# Patient Record
Sex: FEMALE | Race: WHITE | NOT HISPANIC OR LATINO | Employment: UNEMPLOYED | URBAN - METROPOLITAN AREA
[De-identification: names, ages, dates, MRNs, and addresses within clinical notes are randomized per-mention and may not be internally consistent; named-entity substitution may affect disease eponyms.]

---

## 2023-09-07 PROBLEM — Z15.01 GENETIC SUSCEPTIBILITY TO MALIGNANT NEOPLASM OF BREAST: Status: ACTIVE | Noted: 2023-09-07

## 2023-09-07 PROBLEM — M54.31 RIGHT SIDED SCIATICA: Status: ACTIVE | Noted: 2023-09-07

## 2023-09-07 PROBLEM — M81.0 AGE-RELATED OSTEOPOROSIS WITHOUT CURRENT PATHOLOGICAL FRACTURE: Status: ACTIVE | Noted: 2023-09-07

## 2023-09-07 PROBLEM — E55.9 VITAMIN D DEFICIENCY: Status: ACTIVE | Noted: 2023-09-07

## 2023-09-07 PROBLEM — K21.00 GASTROESOPHAGEAL REFLUX DISEASE WITH ESOPHAGITIS WITHOUT HEMORRHAGE: Status: ACTIVE | Noted: 2023-09-07

## 2023-09-07 PROBLEM — Z98.890 HISTORY OF COLPOSCOPY: Status: ACTIVE | Noted: 2023-09-07

## 2023-09-07 RX ORDER — ESTRADIOL 4 UG/1
1 INSERT VAGINAL DAILY
COMMUNITY
Start: 2019-09-20 | End: 2024-02-02 | Stop reason: ALTCHOICE

## 2024-02-01 NOTE — PROGRESS NOTES
Subjective   Shannan Taveras is a 68 y.o. female who presents as a NPV to ESTABLISH PCP CARE and FOR CARE GAP REVIEW.     HPI:      67 yo post-menopausal female NEVER SMOKER NPV to ESTABLISH PCP CARE and FOR CARE GAP REVIEW    [facility] records reviewed.    PMHx:  -hyperlipidemia; , ; 2/21/22  -severe osteoporosis; DEXA 2/9/23; patient opposed to starting aldendronate/bisphonate therapy  -GERD  -Increase lifetime risk of breast cancer; multiple family members with breast cancer  -elevated hemoglobin (15.5) and hemactocrit (44.9); 1/28/23  -elevated bilirubin (1.3); 1/28/2023  -history of abormal pap smears; undergoes yearly paps; per OBGYN continue screens x 10 yrs past last [id number]  -Vit D deficiency  -right sciatica    Healthcare Providers:  GYN: Catina Barcenas; last seen 3/25/23  Prior PCP: Dr. Garcia; last seen 1/27/23 for a Medicare Physical      Preventive Health Services:  -Last physical: 1/27/23  -last pap: 2/8/21- Negative for intraepithelial lesion or malignancy.   -last mammogram: 2/8/23 WNL  -last colonoscopy: 6 years ago per patient no polyps==> NEXT DUE 10 years; per patient next due 2028  -last DEXA 2/9/23: severe osteoporosis  -last STI screening:    Mammogram 2/8/23:  FINDINGS  Scattered fibroglandular densities are again seen in the parenchyma of both  breasts.  There is no significant change since the prior study.  No interval development of a spiculated mass or suspicious cluster of  microcalcifications is identified.  Small nodular densities are again seen in both breasts, similar to prior  exam.     Tyrer Cuzick calculations report this patient's 10-year risk for developing  breast cancer at 3.4 % and lifetime risk at 7.1 %.     IMPRESSION:  No mammographic  evidence of malignancy. No significant change. ACR BI-RADS 2:  Benign finding.    DEXA 2/9/23:  IMPRESSION:  Lumbar spine evaluation demonstrates severe osteopenia.     The overall hip evaluation demonstrates severe  "osteopenia with focal  significant osteoporosis of the femoral neck region.     The VFA images demonstrate no obvious fracture.    Immunizations:   -Childhood vaccines: completed per patient  -shingles: NOW DUE==>DECLINED TODAY, states that she would like to think about it  -Flu vaccine: NOW DUE==> DECLINED  -RSV vaccine: NOW DUE==> DECLINED  -updated COVID vaccine: ==> DECLINED    Immunization History   Administered Date(s) Administered    Moderna SARS-CoV-2 Vaccination 03/10/2021, 04/09/2021    Pneumococcal conjugate vaccine, 20-valent (PREVNAR 20) 01/27/2023    Tdap vaccine, age 7 year and older (BOOSTRIX, ADACEL) 01/01/2009, 05/18/2009, 01/21/2021       Today Shannan reports:    - doing and feeling well.     -significant and strong family history of breast cancer, with her mother experiencing both breast and ovarian cancer.  She reports she has never undergone genetic testing for breast cancer, and is interested in referral to  genetics for testing.    Today she has no reported complaints, issues, or problems.    ROS is NEG for HEADACHE, NAUSEA, VOMITING, DIARRHEA, CHEST PAIN, SOB, and BLEEDING.  Review of systems is negative for all systems except for any identified issues in HPI above.    Last sexually active 10/2023 (no condoms).  Denies history STIs.      SHx:  -lives alone with her cat  -employment: admin part time, [employer]  -tobacco use: NEVER  -alcohol use: 1 beer yearly  -illicits: DENIES      FHx:  Cancer:  -breast and ovarian cancer: mother diagnosed at age 60s;   -breast cancer: maternal aunts, cousins x 2 (diagnosed 40s)   -prostate: father passed of PCa  HTN: mother  DM: DENIES  Heart Disease: DENIES  Stroke: DENIES  Thyroid Disease: DENIES    Advanced Care Planning:  -Advanced Directive: YES  -Code Status:  -Health Care POA: Yes sister-in-law, Rochelle Treviño, is DPOA, brother, Manny Treviño is second        Objective     /70   Pulse 92   Temp 36.9 °C (98.4 °F)   Ht 1.6 m (5' 3\")   " Wt 60.8 kg (134 lb)   SpO2 98%   BMI 23.74 kg/m²      Physical Examination:       GENERAL           General Appearance: well-appearing, well-developed, well-hydrated, well-nourished, no acute distress.        HEENT           NECK supple, no masses or thyromegaly, no carotid bruit.        EYES           Extraocular Movements: normal, bilateral eyes CHANG, no conjunctival injection.        HEART           Rate and Rhythm regular rate and rhythm. Heart sounds: normal S1S2, no S3 or S4. Murmurs: none.        CHEST           Breath sounds: Clear to IPPA, RR<16 no use of accessory muscles.        ABDOMEN           General: Neg for LKKS or masses, no scleral icterus or jaundice.        MUSCULOSKELETAL           Joints Demonstration: Neg for erythema, swelling or joint deformities. gross abnormalities no gross abnormalities.        EXTREMITIES           Lower Extremities: Neg for cyanosis, clubbing or edema.     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    Vitamin D deficiency     Other Visit Diagnoses       Encounter to establish care    -  Primary    Encounter for screening mammogram for malignant neoplasm of breast        Encounter for screening for coronary artery disease        Mixed hyperlipidemia        Immunization counseling        History of abnormal cervical Pap smear        Encounter for hepatitis C screening test for low risk patient        Routine screening for STI (sexually transmitted infection)              Establish PCP care  -labs ordered (see A/P above for details)    Hyperlipidemia  -lipid panel ordered  -low cholesterol, low fat diet, regularly exercise, and limit alcohol intake    Severe Osteoporosis; DEXA 2/9/23  -patient declined referral to rheumatology  ordered  -start OTC Calcium 1,200 mg daily broken up as 400 mg three times a day with meals and Vit D3 2,000 units a day  -discussed bisphonates therapy (alendronate),including the benefits (decrease risk of fracture and reduced bone loss), risks (AVN femoral head), and alternatives (calcium,/weight bearing exercise, etc) to therapy.  Patient not interested in starting aldendronate/bisphonates therapy at this time.  -regular exercise that includes weight bearing exercise, and limit alcohol intake    Strong Family history of breast cancer  -referral to genetics ordered for breast cancer genetic testing    Diabetes Screening  -HgBA1c ordered    Vitamin D deficiency  -Vit D levels ordered     Hep C screening  -Hep C antibody ordered     STI Screening:  -HIV, syphilis, GC/CT, trich ordered    Breast Cancer Screening: MAMMOGRAM NOW DUE   -mammogram ordered     Cervical Cancer Screening; last pap smear 2/8/21- Negative for intraepithelial lesion or malignancy.   -cont pap smears per GYN    Heart Disease Screening:  -CT Heart Ca scoring ordered      Colon Cancer Screening: last colonoscopy 2018 per patient no polyps==> NEXT DUE 10 years ( next due 2028)  -next due 2028     Counseling:       Medication education:         Education:  The patient is counseled regarding potential side-effects of all new medications        Understanding:  Patient expressed understanding        Adherence:  No barriers to adherence identified        Immunizations Counseling  -flu vaccine and shingles now due==> Patient DECLINED  -recommend updated COVID spike and RSV vaccine that can be obtained at local pharmacy     FOLLOW-UP: 4 weeks to discuss and review test results and 8 weeks for MEDICARE WELLNESS VISIT/ PHYSICAL     Discussed recommended plan of care with patient. Patient expressed understanding and agreement with plan of care. All of patient's questions were answered at the time. Patient had no additional questions at the time.            Lynnette Suarez MD, PhD

## 2024-02-02 ENCOUNTER — OFFICE VISIT (OUTPATIENT)
Dept: PRIMARY CARE | Facility: CLINIC | Age: 69
End: 2024-02-02
Payer: MEDICARE

## 2024-02-02 VITALS
DIASTOLIC BLOOD PRESSURE: 70 MMHG | WEIGHT: 134 LBS | TEMPERATURE: 98.4 F | HEIGHT: 63 IN | OXYGEN SATURATION: 98 % | BODY MASS INDEX: 23.74 KG/M2 | SYSTOLIC BLOOD PRESSURE: 118 MMHG | HEART RATE: 92 BPM

## 2024-02-02 DIAGNOSIS — R21 RASH OF NECK: ICD-10-CM

## 2024-02-02 DIAGNOSIS — Z11.59 ENCOUNTER FOR HEPATITIS C SCREENING TEST FOR LOW RISK PATIENT: ICD-10-CM

## 2024-02-02 DIAGNOSIS — K21.00 GASTROESOPHAGEAL REFLUX DISEASE WITH ESOPHAGITIS WITHOUT HEMORRHAGE: ICD-10-CM

## 2024-02-02 DIAGNOSIS — Z71.85 IMMUNIZATION COUNSELING: ICD-10-CM

## 2024-02-02 DIAGNOSIS — Z87.42 HISTORY OF ABNORMAL CERVICAL PAP SMEAR: ICD-10-CM

## 2024-02-02 DIAGNOSIS — Z76.89 ENCOUNTER TO ESTABLISH CARE: Primary | ICD-10-CM

## 2024-02-02 DIAGNOSIS — Z11.3 ROUTINE SCREENING FOR STI (SEXUALLY TRANSMITTED INFECTION): ICD-10-CM

## 2024-02-02 DIAGNOSIS — Z80.3 FAMILY HISTORY OF BREAST CANCER: ICD-10-CM

## 2024-02-02 DIAGNOSIS — E55.9 VITAMIN D DEFICIENCY: ICD-10-CM

## 2024-02-02 DIAGNOSIS — M81.0 AGE-RELATED OSTEOPOROSIS WITHOUT CURRENT PATHOLOGICAL FRACTURE: ICD-10-CM

## 2024-02-02 DIAGNOSIS — Z15.01 GENETIC SUSCEPTIBILITY TO MALIGNANT NEOPLASM OF BREAST: ICD-10-CM

## 2024-02-02 DIAGNOSIS — Z12.31 ENCOUNTER FOR SCREENING MAMMOGRAM FOR MALIGNANT NEOPLASM OF BREAST: ICD-10-CM

## 2024-02-02 DIAGNOSIS — E78.2 MIXED HYPERLIPIDEMIA: ICD-10-CM

## 2024-02-02 DIAGNOSIS — R53.83 OTHER FATIGUE: ICD-10-CM

## 2024-02-02 DIAGNOSIS — Z13.6 ENCOUNTER FOR SCREENING FOR CORONARY ARTERY DISEASE: ICD-10-CM

## 2024-02-02 PROCEDURE — 1126F AMNT PAIN NOTED NONE PRSNT: CPT | Performed by: FAMILY MEDICINE

## 2024-02-02 PROCEDURE — 1157F ADVNC CARE PLAN IN RCRD: CPT | Performed by: FAMILY MEDICINE

## 2024-02-02 PROCEDURE — 99204 OFFICE O/P NEW MOD 45 MIN: CPT | Performed by: FAMILY MEDICINE

## 2024-02-02 PROCEDURE — 1159F MED LIST DOCD IN RCRD: CPT | Performed by: FAMILY MEDICINE

## 2024-02-02 PROCEDURE — 99214 OFFICE O/P EST MOD 30 MIN: CPT | Performed by: FAMILY MEDICINE

## 2024-02-02 RX ORDER — KETOCONAZOLE 20 MG/G
CREAM TOPICAL 2 TIMES DAILY
Qty: 30 G | Refills: 1 | Status: SHIPPED | OUTPATIENT
Start: 2024-02-02 | End: 2024-02-12

## 2024-02-02 ASSESSMENT — PATIENT HEALTH QUESTIONNAIRE - PHQ9
SUM OF ALL RESPONSES TO PHQ9 QUESTIONS 1 AND 2: 0
1. LITTLE INTEREST OR PLEASURE IN DOING THINGS: NOT AT ALL
2. FEELING DOWN, DEPRESSED OR HOPELESS: NOT AT ALL

## 2024-02-02 ASSESSMENT — PAIN SCALES - GENERAL: PAINLEVEL: 0-NO PAIN

## 2024-02-02 NOTE — PATIENT INSTRUCTIONS
It was nice meeting you today.    Please go to [facility] lab or another [facility] lab facility to complete the lab testing that I ordered     I have ordered ketoconazole to apply 2-3 times daily to your rash    Please start OTC Calcium 1,200 mg daily broken up as 400 mg three times a day with meals and Vit D3 2,000 units a day    Please call radiology to schedule to schedule 1) mammogram, 2) CT Heart Ca score    Please call referral line to schedule to see genetics and dermatology. It is usually faster to go to [facility] dermatology.     I recommend receiving the  shingles vaccine, and  the flu vaccine    I recommend the updated COVID vaccine and RSV that can be obtained at your local pharmacy    Please follow a low cholesterol, low fat diet, regularly exercise, and limit alcohol intake    Please schedule a follow up with me in 4  weeks to discuss and review your test results and 8 weeks for a MEDICARE WELLNESS VISIT (PHYSICAL; please use this exact language when scheduling the appointment)

## 2024-02-03 ENCOUNTER — LAB (OUTPATIENT)
Dept: LAB | Facility: LAB | Age: 69
End: 2024-02-03
Payer: MEDICARE

## 2024-02-03 DIAGNOSIS — Z11.59 ENCOUNTER FOR HEPATITIS C SCREENING TEST FOR LOW RISK PATIENT: ICD-10-CM

## 2024-02-03 DIAGNOSIS — E78.2 MIXED HYPERLIPIDEMIA: ICD-10-CM

## 2024-02-03 DIAGNOSIS — E55.9 VITAMIN D DEFICIENCY: ICD-10-CM

## 2024-02-03 DIAGNOSIS — Z76.89 ENCOUNTER TO ESTABLISH CARE: ICD-10-CM

## 2024-02-03 DIAGNOSIS — E87.5 SERUM POTASSIUM ELEVATED: ICD-10-CM

## 2024-02-03 DIAGNOSIS — R53.83 OTHER FATIGUE: ICD-10-CM

## 2024-02-03 DIAGNOSIS — R17 SERUM TOTAL BILIRUBIN ELEVATED: Primary | ICD-10-CM

## 2024-02-03 DIAGNOSIS — Z11.3 ROUTINE SCREENING FOR STI (SEXUALLY TRANSMITTED INFECTION): ICD-10-CM

## 2024-02-03 LAB
25(OH)D3 SERPL-MCNC: 24 NG/ML (ref 31–100)
ALBUMIN SERPL-MCNC: 4.2 G/DL (ref 3.5–5)
ALP BLD-CCNC: 95 U/L (ref 35–125)
ALT SERPL-CCNC: 9 U/L (ref 5–40)
ANION GAP SERPL CALC-SCNC: 10 MMOL/L
AST SERPL-CCNC: 15 U/L (ref 5–40)
BASOPHILS # BLD AUTO: 0.03 X10*3/UL (ref 0–0.1)
BASOPHILS NFR BLD AUTO: 0.5 %
BILIRUB SERPL-MCNC: 1.3 MG/DL (ref 0.1–1.2)
BUN SERPL-MCNC: 16 MG/DL (ref 8–25)
CALCIUM SERPL-MCNC: 9.5 MG/DL (ref 8.5–10.4)
CHLORIDE SERPL-SCNC: 105 MMOL/L (ref 97–107)
CHOLEST SERPL-MCNC: 257 MG/DL (ref 133–200)
CHOLEST/HDLC SERPL: 3.5 {RATIO}
CO2 SERPL-SCNC: 28 MMOL/L (ref 24–31)
CREAT SERPL-MCNC: 0.8 MG/DL (ref 0.4–1.6)
EGFRCR SERPLBLD CKD-EPI 2021: 81 ML/MIN/1.73M*2
EOSINOPHIL # BLD AUTO: 0.1 X10*3/UL (ref 0–0.7)
EOSINOPHIL NFR BLD AUTO: 1.8 %
ERYTHROCYTE [DISTWIDTH] IN BLOOD BY AUTOMATED COUNT: 12.9 % (ref 11.5–14.5)
GLUCOSE SERPL-MCNC: 86 MG/DL (ref 65–99)
HCT VFR BLD AUTO: 42.4 % (ref 36–46)
HCV AB SER QL: NONREACTIVE
HDLC SERPL-MCNC: 74 MG/DL
HGB BLD-MCNC: 13.9 G/DL (ref 12–16)
HIV 1+2 AB+HIV1 P24 AG SERPL QL IA: NONREACTIVE
IMM GRANULOCYTES # BLD AUTO: 0.02 X10*3/UL (ref 0–0.7)
IMM GRANULOCYTES NFR BLD AUTO: 0.4 % (ref 0–0.9)
LDLC SERPL CALC-MCNC: 167 MG/DL (ref 65–130)
LYMPHOCYTES # BLD AUTO: 1.88 X10*3/UL (ref 1.2–4.8)
LYMPHOCYTES NFR BLD AUTO: 34.4 %
MCH RBC QN AUTO: 28.8 PG (ref 26–34)
MCHC RBC AUTO-ENTMCNC: 32.8 G/DL (ref 32–36)
MCV RBC AUTO: 88 FL (ref 80–100)
MONOCYTES # BLD AUTO: 0.4 X10*3/UL (ref 0.1–1)
MONOCYTES NFR BLD AUTO: 7.3 %
NEUTROPHILS # BLD AUTO: 3.03 X10*3/UL (ref 1.2–7.7)
NEUTROPHILS NFR BLD AUTO: 55.6 %
NRBC BLD-RTO: 0 /100 WBCS (ref 0–0)
PLATELET # BLD AUTO: 227 X10*3/UL (ref 150–450)
POTASSIUM SERPL-SCNC: 5.2 MMOL/L (ref 3.4–5.1)
PROT SERPL-MCNC: 6.7 G/DL (ref 5.9–7.9)
RBC # BLD AUTO: 4.83 X10*6/UL (ref 4–5.2)
SODIUM SERPL-SCNC: 143 MMOL/L (ref 133–145)
TRIGL SERPL-MCNC: 80 MG/DL (ref 40–150)
TSH SERPL DL<=0.05 MIU/L-ACNC: 1.89 MIU/L (ref 0.27–4.2)
WBC # BLD AUTO: 5.5 X10*3/UL (ref 4.4–11.3)

## 2024-02-03 PROCEDURE — 36415 COLL VENOUS BLD VENIPUNCTURE: CPT

## 2024-02-03 PROCEDURE — 85025 COMPLETE CBC W/AUTO DIFF WBC: CPT

## 2024-02-03 PROCEDURE — 80053 COMPREHEN METABOLIC PANEL: CPT

## 2024-02-03 PROCEDURE — 87389 HIV-1 AG W/HIV-1&-2 AB AG IA: CPT

## 2024-02-03 PROCEDURE — 84443 ASSAY THYROID STIM HORMONE: CPT

## 2024-02-03 PROCEDURE — 86780 TREPONEMA PALLIDUM: CPT

## 2024-02-03 PROCEDURE — 82306 VITAMIN D 25 HYDROXY: CPT

## 2024-02-03 PROCEDURE — 80061 LIPID PANEL: CPT

## 2024-02-03 PROCEDURE — 86803 HEPATITIS C AB TEST: CPT

## 2024-02-04 LAB — TREPONEMA PALLIDUM IGG+IGM AB [PRESENCE] IN SERUM OR PLASMA BY IMMUNOASSAY: NONREACTIVE

## 2024-03-05 ENCOUNTER — APPOINTMENT (OUTPATIENT)
Dept: PRIMARY CARE | Facility: CLINIC | Age: 69
End: 2024-03-05
Payer: MEDICARE

## 2024-03-07 NOTE — PROGRESS NOTES
Subjective   Shannan Taveras is a 68 y.o. female who presents for FOLLOW-UP VISIT TO DISCUSS and REVIEW TEST RESULTS.    HPI:        67 yo post-menopausal female NEVER SMOKER who presents for FOLLOW-UP VISIT TO DISCUSS and REVIEW TEST RESULTS.     [facility] records reviewed.    Last seen by me on 2/2/24 as  NPV to ESTABLISH PCP CARE and FOR CARE GAP REVIEW. At Visit:  -ORDERED LABS==> COMPLETED  -ORDERED MAMMOGRAM==> NOT YET COMPLETED  -ORDERED CT HEART CA SCORING==>NOT YET COMPLETED  -start ketoconazole to apply 2-3 times daily to your  neck rash  -Strong Family history of breast cancer: ORDERED referral to genetics ordered for breast cancer genetic testing  -severe osteoporosis on DEXA 2/2023: start OTC Calcium 1,200 mg daily broken up as 400 mg three times a day with meals and Vit D3 2,000 units a day  -discussed bisphonates therapy (alendronate),including the benefits (decrease risk of fracture and reduced bone loss), risks (AVN femoral head), and alternatives (calcium,/weight bearing exercise, etc) to therapy.  Patient not interested in starting aldendronate/bisphonates therapy at this time.  -regular exercise that includes weight bearing exercise, and limit alcohol intake  -flu vaccine and shingles now due==> Patient DECLINED  -recommend updated COVID spike and RSV vaccine that can be obtained at local pharmacy   -FOLLOW-UP: 4 weeks to discuss and review test results and 8 weeks for MEDICARE WELLNESS VISIT/ PHYSICAL      After visit on 2/2/24 based on test results:    -Vit D low (24): patient advised to please start over the counter Vitamin D3 2,000 units daily     -elevated cholesterol: total cholesterol 257 and LDL cholesterol 167. Good triglyceride control. Good HDL levels. Patient  advised Please follow a low cholesterol, low fat diet, regularly exercise, and limit alcohol intake.  At your follow-up visit we should discuss starting a statin medication to help lower your cholesterol levels.     -elevated  bilirubin (1.3) and potassium (5.2).  Patient advised that I have ordered a repeat lab and to please go to [facility] to have drawn     -normal kidney function and glucose     -Hep C, syphilis, and HIV negative     -thyroid function normal     -normal blood counts       PMHx:  -hyperlipidemia; , ; 2/21/22  -severe osteoporosis; DEXA 2/9/23; patient opposed to starting aldendronate/bisphonate therapy  -GERD  -Increase lifetime risk of breast cancer; multiple family members with breast cancer  -elevated hemoglobin (15.5) and hemactocrit (44.9); 1/28/23  -elevated bilirubin (1.3); 1/28/2023  -history of abormal pap smears; undergoes yearly paps; per OBGYN continue screens x 10 yrs past last [id number]  -Vit D deficiency  -right sciatica     Healthcare Providers:  GYN: Catina Barcenas; last seen 3/25/23  Prior PCP: Dr. Garcia; last seen 1/27/23 for a Medicare Physical        Preventive Health Services:  -Last physical: 1/27/23  -last pap: 2/8/21- Negative for intraepithelial lesion or malignancy.   -last mammogram: 2/8/23 WNL  -last colonoscopy: 6 years ago per patient no polyps==> NEXT DUE 10 years; per patient next due 2028  -last DEXA 2/9/23: severe osteoporosis  -last STI screening:     Mammogram 2/8/23:  FINDINGS  Scattered fibroglandular densities are again seen in the parenchyma of both  breasts.  There is no significant change since the prior study.  No interval development of a spiculated mass or suspicious cluster of  microcalcifications is identified.  Small nodular densities are again seen in both breasts, similar to prior  exam.     Tyrer Cuzick calculations report this patient's 10-year risk for developing  breast cancer at 3.4 % and lifetime risk at 7.1 %.     IMPRESSION:  No mammographic  evidence of malignancy. No significant change. ACR BI-RADS 2:  Benign finding.     DEXA 2/9/23:  IMPRESSION:  Lumbar spine evaluation demonstrates severe osteopenia.     The overall hip evaluation  demonstrates severe osteopenia with focal  significant osteoporosis of the femoral neck region.     The VFA images demonstrate no obvious fracture.     Immunizations:   -Childhood vaccines: completed per patient  -shingles: NOW DUE==>DECLINED TODAY, states that she would like to think about it  -Flu vaccine: NOW DUE==> DECLINED  -RSV vaccine: NOW DUE==> DECLINED  -updated COVID vaccine: ==> DECLINED       Immunization History   Administered Date(s) Administered    Moderna SARS-CoV-2 Vaccination 03/10/2021, 04/09/2021    Pneumococcal conjugate vaccine, 20-valent (PREVNAR 20) 01/27/2023    Tdap vaccine, age 7 year and older (BOOSTRIX, ADACEL) 01/01/2009, 05/18/2009, 01/21/2021             Today Shannan reports:     - doing and feeling well.     -has been very busy at work and is looking forward to retiring in June 2024 and is very excited.     -has not yet scheduled appointment with genetic testing  for breast cancer, and  dermatology but she will    -stopped using ketoconazole on left neck rash, since it was worsening the rash     Today she has no reported complaints, issues, or problems.     ROS is NEG for HEADACHE, NAUSEA, VOMITING, DIARRHEA, CHEST PAIN, SOB, and BLEEDING.  Review of systems is negative for all systems except for any identified issues in HPI above.     Last sexually active 10/2023 (no condoms).  Denies history STIs.        SHx:  -lives alone with her cat  -employment: admin part time, [employer]  -tobacco use: NEVER  -alcohol use: 1 beer yearly  -illicits: DENIES        FHx:  Cancer:  -breast and ovarian cancer: mother diagnosed at age 60s;   -breast cancer: maternal aunts, cousins x 2 (diagnosed 40s)   -prostate: father passed of PCa  HTN: mother  DM: DENIES  Heart Disease: DENIES  Stroke: DENIES  Thyroid Disease: DENIES     Advanced Care Planning:  -Advanced Directive: YES  -Code Status:  -Health Care POA: Yes sister-in-law, Rochelle Treviño, is DPOA, brother, Manny Treviño is second    Review  "of systems is essentially negative for all systems except for any identified issues in HPI above.    Objective     /70   Pulse 69   Temp 36.3 °C (97.4 °F)   Ht 1.6 m (5' 3\")   Wt 61.2 kg (135 lb)   SpO2 98%   BMI 23.91 kg/m²      Physical Examination:       GENERAL           General Appearance: well-appearing, well-developed, well-hydrated, well-nourished, no acute distress.        HEENT           NECK supple, no masses or thyromegaly, no carotid bruit.        EYES           Extraocular Movements: normal, bilateral eyes CHANG, no conjunctival injection.        HEART           Rate and Rhythm regular rate and rhythm. Heart sounds: normal S1S2, no S3 or S4. Murmurs: none.        CHEST           Breath sounds: Clear to IPPA, RR<16 no use of accessory muscles.        ABDOMEN           General: Neg for LKKS or masses, no scleral icterus or jaundice.        MUSCULOSKELETAL           Joints Demonstration: Neg for erythema, swelling or joint deformities. gross abnormalities no gross abnormalities.        EXTREMITIES           Lower Extremities: Neg for cyanosis, clubbing or edema.       SKIN: left neck rash with mild erythema at nape of left neck. No signs of cellulitis,, no lesions, or papules, no bleeding.        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 ASCVD risk (5.4%) borderline.  -discussed with patient recommendation to start atorvastatin 20 mg daily; patient declined starting atorvastatin at this time  - low cholesterol, low fat diet, regularly exercise, and limit alcohol intake.   -CT Heart Ca scoring previously ordered   "   Elevated bilirubin (1.3) and potassium (5.2).  -previously ordered repeat  CMP==> NOT YET COMPLETED==> patient advised to please go to [facility] to have drawn       Left sided neck rash: no signs of cellulitis: ? Eczema vs fungal skin rash. Per patient ketoconazole worsened rash and is not interested in trial of steroid cream  -referral to dermatology ordered  -emergency dept precautions discussed and reviewed with patient      Severe Osteoporosis; DEXA 2/9/23  -patient declined referral to rheumatology ordered  -cont OTC Calcium 1,200 mg daily broken up as 400 mg three times a day with meals and Vit D3 2,000 units a day  -discussed bisphonates therapy (alendronate),including the benefits (decrease risk of fracture and reduced bone loss), risks (AVN femoral head), and alternatives (calcium,/weight bearing exercise, etc) to therapy.  Patient not interested in starting aldendronate/bisphonates therapy at this time.  -regular exercise that includes weight bearing exercise, and limit alcohol intake     Strong Family history of breast cancer  -referral to genetics ordered on 2/2/24 for breast cancer genetic testing==> patient reports that she will call to schedule      Vitamin D deficiency  -cont over the counter Vitamin D3 2,000 units daily  -will plan to repeat Vit D levels in 3 months; next due 5/2024      Breast Cancer Screening: MAMMOGRAM NOW DUE   -mammogram ordered 2/2/24==> NOT YET COMPLETED==> patient advised to please call radiology to schedule     Cervical Cancer Screening; last pap smear 2/8/21- Negative for intraepithelial lesion or malignancy.   -cont pap smears per GYN     Heart Disease Screening:  -CT Heart Ca scoring ordered 2/2/24==>NOT YET COMPLETED==> patient advised to please call radiology to schedule        Colon Cancer Screening: last colonoscopy 2018 per patient no polyps==> NEXT DUE 10 years ( next due 2028)  -next due 2028     Counseling:       Medication education:         Education:  The  patient is counseled regarding potential side-effects of all new medications        Understanding:  Patient expressed understanding        Adherence:  No barriers to adherence identified        Immunizations Counseling  -flu vaccine and shingles now due==> Patient DECLINED 2/2/24 and today 3/8/24  -recommend updated COVID spike and RSV vaccine that can be obtained at local pharmacy     FOLLOW-UP: 4 weeks to discuss and review test results (mammogram, CT heart Ca scoring and repeat CMP) and 8 weeks for MEDICARE WELLNESS VISIT/ PHYSICAL     Discussed recommended plan of care with patient. Patient expressed understanding and agreement with plan of care. All of patient's questions were answered at the time. Patient had no additional questions at the time.          Lynnette Suarez MD, PhD

## 2024-03-07 NOTE — PATIENT INSTRUCTIONS
It was nice seeing you today.     Please go to [facility] lab or another [facility] lab facility to complete the repeat lab testing that I ordered for elevated bilirubin and elevated potassium levels    Please call dermatology for left neck rash; you can call [facility] dermatology since it is faster to be seen there      Please continue  over the counter Calcium 1,200 mg daily broken up as 400 mg three times a day with meals and Vit D3 2,000 units a day    I recommend starting a statin medication for your elevated cholesterol levels and your borderline increased risk (5.4%) of a cardiovascular disease event (heart attack, stroke) in the next 10 years based on your personal risk factors including your cholesterol levels. Starting a statin medication will reduce this risk.     Please call radiology to schedule to schedule 1) mammogram and 2) CT Heart Ca score     Please call referral line to schedule to see genetics and dermatology. It is usually faster to see dermatology through  [facility] dermatology.      I recommend receiving the  shingles vaccine, and  the flu vaccine     I recommend the updated COVID vaccine and RSV that can be obtained at your local pharmacy     Please follow a low cholesterol, low fat diet, regularly exercise, and limit alcohol intake     Please schedule a follow up with me in 4-6  weeks to discuss and review your test results and 8 weeks for a MEDICARE WELLNESS VISIT (PHYSICAL; please use this exact language when scheduling the appointment)

## 2024-03-08 ENCOUNTER — OFFICE VISIT (OUTPATIENT)
Dept: PRIMARY CARE | Facility: CLINIC | Age: 69
End: 2024-03-08
Payer: MEDICARE

## 2024-03-08 VITALS
WEIGHT: 135 LBS | HEART RATE: 69 BPM | SYSTOLIC BLOOD PRESSURE: 122 MMHG | OXYGEN SATURATION: 98 % | TEMPERATURE: 97.4 F | BODY MASS INDEX: 23.92 KG/M2 | HEIGHT: 63 IN | DIASTOLIC BLOOD PRESSURE: 70 MMHG

## 2024-03-08 DIAGNOSIS — Z71.85 IMMUNIZATION COUNSELING: ICD-10-CM

## 2024-03-08 DIAGNOSIS — Z15.01 GENETIC SUSCEPTIBILITY TO MALIGNANT NEOPLASM OF BREAST: ICD-10-CM

## 2024-03-08 DIAGNOSIS — R21 RASH OF NECK: ICD-10-CM

## 2024-03-08 DIAGNOSIS — E78.2 MIXED HYPERLIPIDEMIA: Primary | ICD-10-CM

## 2024-03-08 DIAGNOSIS — M81.0 AGE-RELATED OSTEOPOROSIS WITHOUT CURRENT PATHOLOGICAL FRACTURE: ICD-10-CM

## 2024-03-08 DIAGNOSIS — E55.9 VITAMIN D DEFICIENCY: ICD-10-CM

## 2024-03-08 PROCEDURE — 1157F ADVNC CARE PLAN IN RCRD: CPT | Performed by: FAMILY MEDICINE

## 2024-03-08 PROCEDURE — 99214 OFFICE O/P EST MOD 30 MIN: CPT | Performed by: FAMILY MEDICINE

## 2024-03-08 PROCEDURE — 1126F AMNT PAIN NOTED NONE PRSNT: CPT | Performed by: FAMILY MEDICINE

## 2024-03-08 PROCEDURE — 1036F TOBACCO NON-USER: CPT | Performed by: FAMILY MEDICINE

## 2024-03-08 PROCEDURE — 1159F MED LIST DOCD IN RCRD: CPT | Performed by: FAMILY MEDICINE

## 2024-03-08 RX ORDER — ACETAMINOPHEN 500 MG
2000 TABLET ORAL DAILY
COMMUNITY

## 2024-03-08 ASSESSMENT — PAIN SCALES - GENERAL: PAINLEVEL: 0-NO PAIN

## 2024-03-12 ENCOUNTER — LAB (OUTPATIENT)
Dept: LAB | Facility: LAB | Age: 69
End: 2024-03-12
Payer: MEDICARE

## 2024-03-12 DIAGNOSIS — R17 SERUM TOTAL BILIRUBIN ELEVATED: ICD-10-CM

## 2024-03-12 DIAGNOSIS — R31.29 MICROSCOPIC HEMATURIA: Primary | ICD-10-CM

## 2024-03-12 DIAGNOSIS — E87.5 SERUM POTASSIUM ELEVATED: ICD-10-CM

## 2024-03-12 DIAGNOSIS — Z76.89 ENCOUNTER TO ESTABLISH CARE: ICD-10-CM

## 2024-03-12 LAB
ALBUMIN SERPL-MCNC: 4 G/DL (ref 3.5–5)
ALP BLD-CCNC: 102 U/L (ref 35–125)
ALT SERPL-CCNC: 9 U/L (ref 5–40)
ANION GAP SERPL CALC-SCNC: 12 MMOL/L
APPEARANCE UR: CLEAR
AST SERPL-CCNC: 15 U/L (ref 5–40)
BACTERIA #/AREA URNS AUTO: ABNORMAL /HPF
BILIRUB SERPL-MCNC: 1.6 MG/DL (ref 0.1–1.2)
BILIRUB UR STRIP.AUTO-MCNC: NEGATIVE MG/DL
BUN SERPL-MCNC: 20 MG/DL (ref 8–25)
CALCIUM SERPL-MCNC: 9.5 MG/DL (ref 8.5–10.4)
CHLORIDE SERPL-SCNC: 103 MMOL/L (ref 97–107)
CO2 SERPL-SCNC: 28 MMOL/L (ref 24–31)
COLOR UR: YELLOW
CREAT SERPL-MCNC: 0.7 MG/DL (ref 0.4–1.6)
EGFRCR SERPLBLD CKD-EPI 2021: >90 ML/MIN/1.73M*2
GLUCOSE SERPL-MCNC: 87 MG/DL (ref 65–99)
GLUCOSE UR STRIP.AUTO-MCNC: NORMAL MG/DL
KETONES UR STRIP.AUTO-MCNC: NEGATIVE MG/DL
LEUKOCYTE ESTERASE UR QL STRIP.AUTO: ABNORMAL
MUCOUS THREADS #/AREA URNS AUTO: ABNORMAL /LPF
NITRITE UR QL STRIP.AUTO: NEGATIVE
PH UR STRIP.AUTO: 5.5 [PH]
POTASSIUM SERPL-SCNC: 3.9 MMOL/L (ref 3.4–5.1)
PROT SERPL-MCNC: 6.6 G/DL (ref 5.9–7.9)
PROT UR STRIP.AUTO-MCNC: ABNORMAL MG/DL
RBC # UR STRIP.AUTO: ABNORMAL /UL
RBC #/AREA URNS AUTO: ABNORMAL /HPF
SODIUM SERPL-SCNC: 143 MMOL/L (ref 133–145)
SP GR UR STRIP.AUTO: 1.02
SQUAMOUS #/AREA URNS AUTO: ABNORMAL /HPF
UROBILINOGEN UR STRIP.AUTO-MCNC: NORMAL MG/DL
WBC #/AREA URNS AUTO: ABNORMAL /HPF

## 2024-03-12 PROCEDURE — 81001 URINALYSIS AUTO W/SCOPE: CPT

## 2024-03-12 PROCEDURE — 36415 COLL VENOUS BLD VENIPUNCTURE: CPT

## 2024-03-12 PROCEDURE — 80053 COMPREHEN METABOLIC PANEL: CPT

## 2024-03-22 ENCOUNTER — HOSPITAL ENCOUNTER (OUTPATIENT)
Dept: RADIOLOGY | Facility: HOSPITAL | Age: 69
Discharge: HOME | End: 2024-03-22
Payer: MEDICARE

## 2024-03-22 VITALS — WEIGHT: 133 LBS | BODY MASS INDEX: 23.57 KG/M2 | HEIGHT: 63 IN

## 2024-03-22 DIAGNOSIS — Z12.31 ENCOUNTER FOR SCREENING MAMMOGRAM FOR MALIGNANT NEOPLASM OF BREAST: ICD-10-CM

## 2024-03-22 DIAGNOSIS — Z13.6 ENCOUNTER FOR SCREENING FOR CORONARY ARTERY DISEASE: ICD-10-CM

## 2024-03-22 PROCEDURE — 75571 CT HRT W/O DYE W/CA TEST: CPT

## 2024-03-22 PROCEDURE — 77067 SCR MAMMO BI INCL CAD: CPT | Performed by: RADIOLOGY

## 2024-03-22 PROCEDURE — 77063 BREAST TOMOSYNTHESIS BI: CPT | Performed by: RADIOLOGY

## 2024-03-22 PROCEDURE — 77067 SCR MAMMO BI INCL CAD: CPT

## 2024-03-25 DIAGNOSIS — R93.1 ELEVATED CORONARY ARTERY CALCIUM SCORE: Primary | ICD-10-CM

## 2024-06-06 ENCOUNTER — APPOINTMENT (OUTPATIENT)
Dept: OBSTETRICS AND GYNECOLOGY | Facility: CLINIC | Age: 69
End: 2024-06-06
Payer: MEDICARE

## 2024-06-06 ENCOUNTER — OFFICE VISIT (OUTPATIENT)
Dept: OBSTETRICS AND GYNECOLOGY | Facility: CLINIC | Age: 69
End: 2024-06-06
Payer: MEDICARE

## 2024-06-06 VITALS
HEIGHT: 63 IN | SYSTOLIC BLOOD PRESSURE: 132 MMHG | BODY MASS INDEX: 25.16 KG/M2 | WEIGHT: 142 LBS | DIASTOLIC BLOOD PRESSURE: 76 MMHG

## 2024-06-06 DIAGNOSIS — Z11.51 SCREENING FOR HUMAN PAPILLOMAVIRUS: ICD-10-CM

## 2024-06-06 DIAGNOSIS — Z01.419 VISIT FOR GYNECOLOGIC EXAMINATION: Primary | ICD-10-CM

## 2024-06-06 DIAGNOSIS — Z12.11 SCREEN FOR COLON CANCER: ICD-10-CM

## 2024-06-06 DIAGNOSIS — M81.0 POSTMENOPAUSAL BONE LOSS: ICD-10-CM

## 2024-06-06 DIAGNOSIS — Z78.0 MENOPAUSE: ICD-10-CM

## 2024-06-06 DIAGNOSIS — Z12.4 SCREENING FOR CERVICAL CANCER: ICD-10-CM

## 2024-06-06 DIAGNOSIS — Z87.410 HISTORY OF CERVICAL DYSPLASIA: ICD-10-CM

## 2024-06-06 DIAGNOSIS — N95.2 POSTMENOPAUSAL ATROPHIC VAGINITIS: ICD-10-CM

## 2024-06-06 PROCEDURE — 1036F TOBACCO NON-USER: CPT | Performed by: OBSTETRICS & GYNECOLOGY

## 2024-06-06 PROCEDURE — 1157F ADVNC CARE PLAN IN RCRD: CPT | Performed by: OBSTETRICS & GYNECOLOGY

## 2024-06-06 PROCEDURE — 99397 PER PM REEVAL EST PAT 65+ YR: CPT | Performed by: OBSTETRICS & GYNECOLOGY

## 2024-06-06 PROCEDURE — 87624 HPV HI-RISK TYP POOLED RSLT: CPT | Performed by: OBSTETRICS & GYNECOLOGY

## 2024-06-06 PROCEDURE — 1159F MED LIST DOCD IN RCRD: CPT | Performed by: OBSTETRICS & GYNECOLOGY

## 2024-06-06 PROCEDURE — 1126F AMNT PAIN NOTED NONE PRSNT: CPT | Performed by: OBSTETRICS & GYNECOLOGY

## 2024-06-06 ASSESSMENT — LIFESTYLE VARIABLES
SKIP TO QUESTIONS 9-10: 1
HOW OFTEN DO YOU HAVE SIX OR MORE DRINKS ON ONE OCCASION: NEVER
AUDIT-C TOTAL SCORE: 0
HOW OFTEN DO YOU HAVE A DRINK CONTAINING ALCOHOL: NEVER
HOW MANY STANDARD DRINKS CONTAINING ALCOHOL DO YOU HAVE ON A TYPICAL DAY: PATIENT DOES NOT DRINK

## 2024-06-06 ASSESSMENT — ENCOUNTER SYMPTOMS
DIZZINESS: 0
DEPRESSION: 0
UNEXPECTED WEIGHT CHANGE: 0
JOINT SWELLING: 0
SHORTNESS OF BREATH: 0
HEADACHES: 0
DIFFICULTY URINATING: 0
ADENOPATHY: 0
ABDOMINAL DISTENTION: 0
WEAKNESS: 0
ACTIVITY CHANGE: 0
CHEST TIGHTNESS: 0
ABDOMINAL PAIN: 0
FATIGUE: 0
DYSURIA: 0
COLOR CHANGE: 0
OCCASIONAL FEELINGS OF UNSTEADINESS: 0
LOSS OF SENSATION IN FEET: 0

## 2024-06-06 ASSESSMENT — PATIENT HEALTH QUESTIONNAIRE - PHQ9
2. FEELING DOWN, DEPRESSED OR HOPELESS: NOT AT ALL
1. LITTLE INTEREST OR PLEASURE IN DOING THINGS: NOT AT ALL
SUM OF ALL RESPONSES TO PHQ9 QUESTIONS 1 & 2: 0

## 2024-06-06 ASSESSMENT — PAIN SCALES - GENERAL: PAINLEVEL: 0-NO PAIN
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
## 2024-06-10 ENCOUNTER — APPOINTMENT (OUTPATIENT)
Dept: OBSTETRICS AND GYNECOLOGY | Facility: CLINIC | Age: 69
End: 2024-06-10
Payer: MEDICARE

## 2024-06-17 ENCOUNTER — APPOINTMENT (OUTPATIENT)
Dept: CARDIOLOGY | Facility: CLINIC | Age: 69
End: 2024-06-17
Payer: MEDICARE

## 2024-06-21 LAB
CYTOLOGY CMNT CVX/VAG CYTO-IMP: NORMAL
HPV HR 12 DNA GENITAL QL NAA+PROBE: NEGATIVE
HPV HR GENOTYPES PNL CVX NAA+PROBE: NEGATIVE
HPV16 DNA SPEC QL NAA+PROBE: NEGATIVE
HPV18 DNA SPEC QL NAA+PROBE: NEGATIVE
LAB AP HPV GENOTYPE QUESTION: YES
LAB AP HPV HR: NORMAL
LAB AP PREVIOUS ABNORMAL HISTORY: NORMAL
LAB AP TREATMENT HISTORY: NORMAL
LABORATORY COMMENT REPORT: NORMAL
MENSTRUAL HX REPORTED: NORMAL
PATH REPORT.TOTAL CANCER: NORMAL

## 2024-07-01 ENCOUNTER — APPOINTMENT (OUTPATIENT)
Dept: PRIMARY CARE | Facility: CLINIC | Age: 69
End: 2024-07-01
Payer: MEDICARE

## 2024-07-17 ENCOUNTER — OFFICE VISIT (OUTPATIENT)
Dept: CARDIOLOGY | Facility: CLINIC | Age: 69
End: 2024-07-17
Payer: MEDICARE

## 2024-07-17 VITALS
BODY MASS INDEX: 24.98 KG/M2 | WEIGHT: 141 LBS | HEIGHT: 63 IN | DIASTOLIC BLOOD PRESSURE: 86 MMHG | RESPIRATION RATE: 18 BRPM | HEART RATE: 66 BPM | SYSTOLIC BLOOD PRESSURE: 136 MMHG

## 2024-07-17 DIAGNOSIS — R93.1 ELEVATED CORONARY ARTERY CALCIUM SCORE: ICD-10-CM

## 2024-07-17 DIAGNOSIS — I25.10 CORONARY ARTERY DISEASE INVOLVING NATIVE CORONARY ARTERY OF NATIVE HEART WITHOUT ANGINA PECTORIS: Primary | ICD-10-CM

## 2024-07-17 PROCEDURE — 1126F AMNT PAIN NOTED NONE PRSNT: CPT | Performed by: INTERNAL MEDICINE

## 2024-07-17 PROCEDURE — 1157F ADVNC CARE PLAN IN RCRD: CPT | Performed by: INTERNAL MEDICINE

## 2024-07-17 PROCEDURE — 99204 OFFICE O/P NEW MOD 45 MIN: CPT | Performed by: INTERNAL MEDICINE

## 2024-07-17 PROCEDURE — 99214 OFFICE O/P EST MOD 30 MIN: CPT | Performed by: INTERNAL MEDICINE

## 2024-07-17 PROCEDURE — 1159F MED LIST DOCD IN RCRD: CPT | Performed by: INTERNAL MEDICINE

## 2024-07-17 PROCEDURE — 3008F BODY MASS INDEX DOCD: CPT | Performed by: INTERNAL MEDICINE

## 2024-07-17 PROCEDURE — 1036F TOBACCO NON-USER: CPT | Performed by: INTERNAL MEDICINE

## 2024-07-17 RX ORDER — ROSUVASTATIN CALCIUM 10 MG/1
10 TABLET, COATED ORAL DAILY
Qty: 60 TABLET | Refills: 0 | Status: SHIPPED | OUTPATIENT
Start: 2024-07-17 | End: 2024-09-15

## 2024-07-17 ASSESSMENT — PATIENT HEALTH QUESTIONNAIRE - PHQ9
SUM OF ALL RESPONSES TO PHQ9 QUESTIONS 1 AND 2: 0
2. FEELING DOWN, DEPRESSED OR HOPELESS: NOT AT ALL
1. LITTLE INTEREST OR PLEASURE IN DOING THINGS: NOT AT ALL

## 2024-07-17 ASSESSMENT — PAIN SCALES - GENERAL: PAINLEVEL: 0-NO PAIN

## 2024-07-17 ASSESSMENT — ENCOUNTER SYMPTOMS
LOSS OF SENSATION IN FEET: 0
OCCASIONAL FEELINGS OF UNSTEADINESS: 0
DEPRESSION: 0

## 2024-07-17 ASSESSMENT — LIFESTYLE VARIABLES
HOW OFTEN DO YOU HAVE SIX OR MORE DRINKS ON ONE OCCASION: NEVER
HOW MANY STANDARD DRINKS CONTAINING ALCOHOL DO YOU HAVE ON A TYPICAL DAY: PATIENT DOES NOT DRINK
SKIP TO QUESTIONS 9-10: 1
HOW OFTEN DO YOU HAVE A DRINK CONTAINING ALCOHOL: NEVER
AUDIT-C TOTAL SCORE: 0

## 2024-07-17 NOTE — PROGRESS NOTES
Primary Care Physician: Lynnette Suarez MD PhD   Date of Visit: 07/17/2024  8:00 AM EDT  Type of Visit: New patient visit    Chief Complaint:  Chief Complaint   Patient presents with    New Patient Visit     Calcium score 248        HPI  Shannan Taveras 68 y.o. female who presents establish care.    She denies any angina, shortness of breath, paroxysmal nocturnal dyspnea, orthopnea, peripheral edema, near-syncope, syncope, or palpitations.  Reports that she is fairly active.    Has been referred to me for an elevated calcium score.    Review of Systems   12 point review of systems was obtained in detail and is negative other than that noted above or below.     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    Shannan has a past surgical history that includes Other surgical history (12/24/2013); Other surgical history (12/24/2013); Colposcopy; and Breast biopsy.    Social/Family History:  Shannan reports that she has never smoked. She has never used smokeless tobacco. She reports that she does not currently use alcohol. She reports that she does not use drugs.    Shannan's family history includes Breast cancer in her mother and mother's sister; Cancer in her father; Dementia in her brother; No Known Problems in her brother, son, son, and son; Ovarian cancer in her mother; Prostate cancer in her father; gastric bypass in her brother.    Allergies:  Allergies   Allergen Reactions    Penicillin Hives    Sulfa (Sulfonamide Antibiotics) Hives       Medications:  Current Outpatient Medications   Medication Sig Dispense Refill    calcium carbonate (CALCIUM 600 ORAL) Take 1 tablet by mouth once daily.      cholecalciferol (Vitamin D3) 50 mcg (2,000 unit) capsule Take 1 capsule (50 mcg) by mouth  once daily.      multivit-min/vit C/herb no.124 (AIRBORNE GUMMY ORAL) Take 2 tablets by mouth once daily.      rosuvastatin (Crestor) 10 mg tablet Take 1 tablet (10 mg) by mouth once daily. 60 tablet 0     No current facility-administered medications for this visit.       Objective:   Vitals:    07/17/24 0754   BP: 136/86   Pulse: 66   Resp: 18       S1-S2 normal, regular rate and rhythm, no murmurs, rubs, or gallops  Clear to auscultation bilaterally    Labs and Imaging:      Latest Ref Rng & Units 6/30/2018     9:53 AM 10/18/2019     9:54 AM 1/23/2021     8:31 AM 2/21/2022     8:51 AM 1/28/2023     9:04 AM 2/3/2024     9:09 AM 3/12/2024     7:57 AM   Electrolytes   Na 133 - 145 mmol/L 141  142  140  142  141  143  143    K 3.4 - 5.1 mmol/L 4.2  4.5  4.2  4.0  4.4  5.2  3.9    Cl 97 - 107 mmol/L 105  103  103  103  102  105  103    CO2 24 - 31 mmol/L 22  27  26  24  25  28  28    Cr 0.40 - 1.60 mg/dL 0.7  0.7  0.8  0.7  0.7  0.80  0.70    Ca 8.5 - 10.4 mg/dL 9.7  9.8  9.4  9.3  9.4  9.5  9.5          Latest Ref Rng & Units 6/30/2018     9:53 AM 10/18/2019     9:54 AM 1/23/2021     8:31 AM 2/6/2021     9:02 AM 2/21/2022     8:51 AM 1/28/2023     9:04 AM 2/3/2024     9:09 AM   CBC   Hb 12.0 - 16.0 g/dL 15.4  14.6  14.4  14.2  15.2  15.5  13.9    Hct 36.0 - 46.0 % 44.5  43.1  45.0  42.2  44.6  44.9  42.4    MCV 80 - 100 fL 85.7  87.6  89.5  88.3  85.6  86.0  88    RDW 11.5 - 14.5 %       12.9          Latest Ref Rng & Units 6/30/2018     9:53 AM 10/18/2019     9:54 AM 1/23/2021     8:31 AM 2/21/2022     8:51 AM 1/28/2023     9:04 AM 2/3/2024     9:09 AM 3/12/2024     7:57 AM   Lipids   Chol 133 - 200 mg/dL 242  278  208  248  229  257     HDL >50.0 mg/dL 63  79  68  76  67  74.0     LDL 65 - 130 mg/dL 150  179  124  156  143  167     Trig 40 - 150 mg/dL 144  101  79  78  96  80     ALT 5 - 40 U/L 11  14  15  14  11  9  9    AST 5 - 40 U/L 16  18  15  18  15  15  15          Latest Ref Rng & Units 6/30/2018     9:53 AM  "10/18/2019     9:54 AM 1/23/2021     8:31 AM 2/3/2024     9:09 AM   Thyroid   TSH 0.27 - 4.20 mIU/L 1.67  1.53  1.46  1.89           No data to display                 No results found for: \"HGBA1C\", \"ALBUR\"     The 10-year ASCVD risk score (Jany MATT, et al., 2019) is: 7%    Values used to calculate the score:      Age: 68 years      Sex: Female      Is Non- : No      Diabetic: No      Tobacco smoker: No      Systolic Blood Pressure: 136 mmHg      Is BP treated: No      HDL Cholesterol: 74 mg/dL      Total Cholesterol: 257 mg/dL     Echocardiogram: No results found for this or any previous visit.     Echocardiogram: No results found for this or any previous visit from the past 1825 days.    Stress Testing: No results found for this or any previous visit from the past 1825 days.    Cardiac Catheterization: No results found for this or any previous visit from the past 1825 days.    Cardiac Scoring:     FINDINGS:  The score and distribution of calcium in the coronary arteries is as  follows:    LM 0,  ,  LCx 12,  RCA 98,    Total 248    The visualized mid/lower ascending thoracic aorta measures 3.2 cm in  diameter. The heart is normal in size. No pericardial effusion is  present.    No gross evidence of mediastinal or hilar lymphadenopathy or masses  is identified. The visualized segments of the lungs are normally  expanded.    The visualized subdiaphragmatic structures appear intact.    Impression  1. Coronary artery calcium score of 248*.      AAA : No results found for this or any previous visit from the past 1825 days.    OTHER: No results found for this or any previous visit from the past 1825 days.        Assessment/Plan:   1. Coronary artery disease involving native coronary artery of native heart without angina pectoris  rosuvastatin (Crestor) 10 mg tablet    Transthoracic Echo (TTE) Complete      2. Elevated coronary artery calcium score  Referral to Cardiology           Shannan" Darcy 68 y.o. female who presents establish care:    1.  Coronary artery disease without angina (coronary calcium score of 248 (  2.  Hyperlipidemia    Start rosuvastatin 10 mg daily.  Check echocardiogram.  Virtual visit in 1 month after testing.          ____________________________________________________________  Omari Macario MD

## 2024-08-05 ENCOUNTER — HOSPITAL ENCOUNTER (OUTPATIENT)
Dept: CARDIOLOGY | Facility: CLINIC | Age: 69
Discharge: HOME | End: 2024-08-05
Payer: MEDICARE

## 2024-08-05 VITALS — WEIGHT: 141 LBS | BODY MASS INDEX: 24.98 KG/M2 | HEIGHT: 63 IN

## 2024-08-05 DIAGNOSIS — I25.10 CORONARY ARTERY DISEASE INVOLVING NATIVE CORONARY ARTERY OF NATIVE HEART WITHOUT ANGINA PECTORIS: ICD-10-CM

## 2024-08-05 LAB
AORTIC VALVE PEAK VELOCITY: 1.19 M/S
AV PEAK GRADIENT: 5.7 MMHG
AVA (PEAK VEL): 2.36 CM2
EJECTION FRACTION APICAL 4 CHAMBER: 59.6
EJECTION FRACTION: 58 %
LEFT ATRIUM VOLUME AREA LENGTH INDEX BSA: 25.7 ML/M2
LEFT VENTRICLE INTERNAL DIMENSION DIASTOLE: 4.58 CM (ref 3.5–6)
LEFT VENTRICULAR OUTFLOW TRACT DIAMETER: 1.94 CM
MITRAL VALVE E/A RATIO: 0.95
RIGHT VENTRICLE FREE WALL PEAK S': 13.15 CM/S

## 2024-08-05 PROCEDURE — 93306 TTE W/DOPPLER COMPLETE: CPT

## 2024-08-05 PROCEDURE — 93306 TTE W/DOPPLER COMPLETE: CPT | Performed by: INTERNAL MEDICINE

## 2024-08-14 ENCOUNTER — TELEMEDICINE (OUTPATIENT)
Dept: CARDIOLOGY | Facility: CLINIC | Age: 69
End: 2024-08-14
Payer: MEDICARE

## 2024-08-14 DIAGNOSIS — I25.10 CORONARY ARTERY DISEASE INVOLVING NATIVE CORONARY ARTERY OF NATIVE HEART WITHOUT ANGINA PECTORIS: ICD-10-CM

## 2024-08-14 PROCEDURE — 1157F ADVNC CARE PLAN IN RCRD: CPT | Performed by: INTERNAL MEDICINE

## 2024-08-14 PROCEDURE — 1126F AMNT PAIN NOTED NONE PRSNT: CPT | Performed by: INTERNAL MEDICINE

## 2024-08-14 PROCEDURE — 1159F MED LIST DOCD IN RCRD: CPT | Performed by: INTERNAL MEDICINE

## 2024-08-14 PROCEDURE — 99212 OFFICE O/P EST SF 10 MIN: CPT | Performed by: INTERNAL MEDICINE

## 2024-08-14 RX ORDER — ROSUVASTATIN CALCIUM 10 MG/1
10 TABLET, COATED ORAL DAILY
Qty: 90 TABLET | Refills: 3 | Status: SHIPPED | OUTPATIENT
Start: 2024-08-14 | End: 2025-08-14

## 2024-08-14 ASSESSMENT — LIFESTYLE VARIABLES
HOW OFTEN DO YOU HAVE A DRINK CONTAINING ALCOHOL: NEVER
HOW OFTEN DO YOU HAVE SIX OR MORE DRINKS ON ONE OCCASION: NEVER
SKIP TO QUESTIONS 9-10: 1
AUDIT-C TOTAL SCORE: 0
HOW MANY STANDARD DRINKS CONTAINING ALCOHOL DO YOU HAVE ON A TYPICAL DAY: PATIENT DOES NOT DRINK

## 2024-08-14 ASSESSMENT — PAIN SCALES - GENERAL: PAINLEVEL: 0-NO PAIN

## 2024-08-14 NOTE — PROGRESS NOTES
Primary Care Physician: Lynnette Suarez MD PhD   Date of Visit: 08/14/2024  3:00 PM EDT  Type of Visit: New patient visit    Chief Complaint:  CAD     HPI  Shannan Taveras 68 y.o. female who presents establish care.    Remains asymptomatic: denies any angina, shortness of breath, paroxysmal nocturnal dyspnea, orthopnea, peripheral edema, near-syncope, syncope, or palpitations.  Reports that she is fairly active.    Has kept a blood pressure log for the last 4 days, blood pressure at goal.    Review of Systems   12 point review of systems was obtained in detail and is negative other than that noted above or below.     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    Shannan has a past surgical history that includes Other surgical history (12/24/2013); Other surgical history (12/24/2013); Colposcopy; and Breast biopsy.    Social/Family History:  Shannan reports that she has never smoked. She has never used smokeless tobacco. She reports that she does not currently use alcohol. She reports that she does not use drugs.    Shannan's family history includes Breast cancer in her mother and mother's sister; Cancer in her father; Dementia in her brother; No Known Problems in her brother, son, son, and son; Ovarian cancer in her mother; Prostate cancer in her father; gastric bypass in her brother.    Allergies:  Allergies   Allergen Reactions    Penicillin Hives    Sulfa (Sulfonamide Antibiotics) Hives       Medications:  Current Outpatient Medications   Medication Sig Dispense Refill    calcium carbonate (CALCIUM 600 ORAL) Take 1 tablet by mouth once daily.      cholecalciferol (Vitamin D3) 50 mcg (2,000 unit) capsule Take 1 capsule (50 mcg) by mouth once daily.      multivit-min/vit C/herb no.124  (AIRBORNE GUMMY ORAL) Take 2 tablets by mouth once daily.      rosuvastatin (Crestor) 10 mg tablet Take 1 tablet (10 mg) by mouth once daily. 90 tablet 3     No current facility-administered medications for this visit.       Objective:   There were no vitals filed for this visit.    Virtual visit.    Labs and Imaging:      Latest Ref Rng & Units 6/30/2018     9:53 AM 10/18/2019     9:54 AM 1/23/2021     8:31 AM 2/21/2022     8:51 AM 1/28/2023     9:04 AM 2/3/2024     9:09 AM 3/12/2024     7:57 AM   Electrolytes   Na 133 - 145 mmol/L 141  142  140  142  141  143  143    K 3.4 - 5.1 mmol/L 4.2  4.5  4.2  4.0  4.4  5.2  3.9    Cl 97 - 107 mmol/L 105  103  103  103  102  105  103    CO2 24 - 31 mmol/L 22  27  26  24  25  28  28    Cr 0.40 - 1.60 mg/dL 0.7  0.7  0.8  0.7  0.7  0.80  0.70    Ca 8.5 - 10.4 mg/dL 9.7  9.8  9.4  9.3  9.4  9.5  9.5          Latest Ref Rng & Units 6/30/2018     9:53 AM 10/18/2019     9:54 AM 1/23/2021     8:31 AM 2/6/2021     9:02 AM 2/21/2022     8:51 AM 1/28/2023     9:04 AM 2/3/2024     9:09 AM   CBC   Hb 12.0 - 16.0 g/dL 15.4  14.6  14.4  14.2  15.2  15.5  13.9    Hct 36.0 - 46.0 % 44.5  43.1  45.0  42.2  44.6  44.9  42.4    MCV 80 - 100 fL 85.7  87.6  89.5  88.3  85.6  86.0  88    RDW 11.5 - 14.5 %       12.9          Latest Ref Rng & Units 6/30/2018     9:53 AM 10/18/2019     9:54 AM 1/23/2021     8:31 AM 2/21/2022     8:51 AM 1/28/2023     9:04 AM 2/3/2024     9:09 AM 3/12/2024     7:57 AM   Lipids   Chol 133 - 200 mg/dL 242  278  208  248  229  257     HDL >50.0 mg/dL 63  79  68  76  67  74.0     LDL 65 - 130 mg/dL 150  179  124  156  143  167     Trig 40 - 150 mg/dL 144  101  79  78  96  80     ALT 5 - 40 U/L 11  14  15  14  11  9  9    AST 5 - 40 U/L 16  18  15  18  15  15  15          Latest Ref Rng & Units 6/30/2018     9:53 AM 10/18/2019     9:54 AM 1/23/2021     8:31 AM 2/3/2024     9:09 AM   Thyroid   TSH 0.27 - 4.20 mIU/L 1.67  1.53  1.46  1.89           No data to display     "             No results found for: \"HGBA1C\", \"ALBUR\"     The 10-year ASCVD risk score (Jany MATT, et al., 2019) is: 7%    Values used to calculate the score:      Age: 68 years      Sex: Female      Is Non- : No      Diabetic: No      Tobacco smoker: No      Systolic Blood Pressure: 136 mmHg      Is BP treated: No      HDL Cholesterol: 74 mg/dL      Total Cholesterol: 257 mg/dL     Echocardiogram: No results found for this or any previous visit.     Echocardiogram: No results found for this or any previous visit from the past 1825 days.    Stress Testing: No results found for this or any previous visit from the past 1825 days.    Cardiac Catheterization: No results found for this or any previous visit from the past 1825 days.    Cardiac Scoring:     FINDINGS:  The score and distribution of calcium in the coronary arteries is as  follows:    LM 0,  ,  LCx 12,  RCA 98,    Total 248    The visualized mid/lower ascending thoracic aorta measures 3.2 cm in  diameter. The heart is normal in size. No pericardial effusion is  present.    No gross evidence of mediastinal or hilar lymphadenopathy or masses  is identified. The visualized segments of the lungs are normally  expanded.    The visualized subdiaphragmatic structures appear intact.    Impression  1. Coronary artery calcium score of 248*.      AAA : No results found for this or any previous visit from the past 1825 days.    OTHER: No results found for this or any previous visit from the past 1825 days.        Assessment/Plan:   1. Coronary artery disease involving native coronary artery of native heart without angina pectoris  rosuvastatin (Crestor) 10 mg tablet           Shannan Taveras 68 y.o. female who presents establish care:    1.  Coronary artery disease without angina (coronary calcium score of 248 (  2.  Hyperlipidemia    Tolerating rosuvastatin 10 mg nightly without issues. Reviewed echocardiogram findings with her. Follow up in " 1 year.     Asked her to follow BP log for a total of 2 weeks and to alert me if SBP > 140 mmHg, DBP > 90 mmHg.     ____________________________________________________________  Omari Macario MD

## 2024-11-30 ENCOUNTER — OFFICE VISIT (OUTPATIENT)
Dept: URGENT CARE | Age: 69
End: 2024-11-30
Payer: MEDICARE

## 2024-11-30 VITALS
DIASTOLIC BLOOD PRESSURE: 82 MMHG | HEART RATE: 89 BPM | SYSTOLIC BLOOD PRESSURE: 137 MMHG | OXYGEN SATURATION: 99 % | TEMPERATURE: 97.5 F | RESPIRATION RATE: 18 BRPM

## 2024-11-30 DIAGNOSIS — R39.15 URINARY URGENCY: ICD-10-CM

## 2024-11-30 DIAGNOSIS — N30.00 ACUTE CYSTITIS WITHOUT HEMATURIA: Primary | ICD-10-CM

## 2024-11-30 LAB
POC APPEARANCE, URINE: CLEAR
POC BILIRUBIN, URINE: NEGATIVE
POC BLOOD, URINE: ABNORMAL
POC COLOR, URINE: YELLOW
POC GLUCOSE, URINE: NEGATIVE MG/DL
POC KETONES, URINE: NEGATIVE MG/DL
POC LEUKOCYTES, URINE: ABNORMAL
POC NITRITE,URINE: NEGATIVE
POC PH, URINE: 5.5 PH
POC PROTEIN, URINE: ABNORMAL MG/DL
POC SPECIFIC GRAVITY, URINE: >=1.03
POC UROBILINOGEN, URINE: 0.2 EU/DL

## 2024-11-30 PROCEDURE — 87086 URINE CULTURE/COLONY COUNT: CPT

## 2024-11-30 RX ORDER — CIPROFLOXACIN 500 MG/1
500 TABLET ORAL 2 TIMES DAILY
Qty: 14 TABLET | Refills: 0 | Status: SHIPPED | OUTPATIENT
Start: 2024-11-30 | End: 2024-12-07

## 2024-11-30 ASSESSMENT — ENCOUNTER SYMPTOMS
ENDOCRINE NEGATIVE: 1
PSYCHIATRIC NEGATIVE: 1
FREQUENCY: 1
HEMATURIA: 0
BACK PAIN: 0
DYSURIA: 1
HEMATOLOGIC/LYMPHATIC NEGATIVE: 1
NEUROLOGICAL NEGATIVE: 1
EYES NEGATIVE: 1
ALLERGIC/IMMUNOLOGIC NEGATIVE: 1
RESPIRATORY NEGATIVE: 1
CARDIOVASCULAR NEGATIVE: 1
FEVER: 0
ABDOMINAL PAIN: 1

## 2024-11-30 NOTE — PROGRESS NOTES
"Subjective   Patient ID: Shannan Taveras \"Rich" is a 68 y.o. female. They present today with a chief complaint of Difficulty Urinating (Yesterday x urinary urgency, some discomfort ).    History of Present Illness    History provided by:  Patient   used: No    Difficulty Urinating  Associated symptoms: abdominal pain    Associated symptoms: no fever and no vaginal discharge      This is a 68 yr old female here for  sxs. Urine frequency, dysuria, low abdominal discomfort and oliguria x 1 day. No fever, back pain, hematuria, genital rash or lesions, or abnormal vaginal discharge.   Past Medical History  Allergies as of 2024 - Reviewed 2024   Allergen Reaction Noted    Penicillin Hives 2023    Sulfa (sulfonamide antibiotics) Hives 2023       (Not in a hospital admission)       Past Medical History:   Diagnosis Date    Abnormal Pap smear of cervix     2 cm cyst on L breast    Benign lipomatous neoplasm of skin and subcutaneous tissue of unspecified limb 2016    Lipoma of thigh    Depression     Encounter for full-term uncomplicated delivery ([facility])      (spontaneous vaginal delivery)    Osteoporosis        Past Surgical History:   Procedure Laterality Date    BREAST BIOPSY      COLPOSCOPY      OTHER SURGICAL HISTORY  2013    Hemilaminectomy With Disc Removal One Lumbar Interspace    OTHER SURGICAL HISTORY  2013    Cervical Conization        reports that she has never smoked. She has never used smokeless tobacco. She reports that she does not currently use alcohol. She reports that she does not use drugs.    Review of Systems  Review of Systems   Constitutional:  Negative for fever.   HENT: Negative.     Eyes: Negative.    Respiratory: Negative.     Cardiovascular: Negative.    Gastrointestinal:  Positive for abdominal pain.   Endocrine: Negative.    Genitourinary:  Positive for dysuria and frequency. Negative for genital sores, hematuria and vaginal " discharge.   Musculoskeletal:  Negative for back pain.   Skin: Negative.    Allergic/Immunologic: Negative.    Neurological: Negative.    Hematological: Negative.    Psychiatric/Behavioral: Negative.     All other systems reviewed and are negative.       Objective    Vitals:    11/30/24 1358   BP: 137/82   Pulse: 89   Resp: 18   Temp: 36.4 °C (97.5 °F)   SpO2: 99%     No LMP recorded. Patient is postmenopausal.    Physical Exam  Vitals and nursing note reviewed.   Constitutional:       Appearance: Normal appearance.   HENT:      Head: Normocephalic and atraumatic.   Cardiovascular:      Rate and Rhythm: Normal rate and regular rhythm.   Pulmonary:      Effort: Pulmonary effort is normal.      Breath sounds: Normal breath sounds.   Abdominal:      Palpations: Abdomen is soft.      Tenderness: There is abdominal tenderness (SP). There is no right CVA tenderness or left CVA tenderness.   Skin:     General: Skin is warm and dry.   Neurological:      General: No focal deficit present.      Mental Status: She is alert and oriented to person, place, and time.   Psychiatric:         Mood and Affect: Mood normal.         Behavior: Behavior normal.       Procedures    Point of Care Test & Imaging Results from this visit  Results for orders placed or performed in visit on 11/30/24   POCT UA Automated manually resulted   Result Value Ref Range    POC Color, Urine Yellow Straw, Yellow, Light-Yellow    POC Appearance, Urine Clear Clear    POC Glucose, Urine NEGATIVE NEGATIVE mg/dl    POC Bilirubin, Urine NEGATIVE NEGATIVE    POC Ketones, Urine NEGATIVE NEGATIVE mg/dl    POC Specific Gravity, Urine >=1.030 1.005 - 1.035    POC Blood, Urine LARGE (3+) (A) NEGATIVE    POC PH, Urine 5.5 No Reference Range Established PH    POC Protein, Urine 15 (1+) (A) NEGATIVE, 30 (1+) mg/dl    POC Urobilinogen, Urine 0.2 0.2, 1.0 EU/DL    Poc Nitrite, Urine NEGATIVE NEGATIVE    POC Leukocytes, Urine LARGE (3+) (A) NEGATIVE      No results  found.    Diagnostic study results (if any) were reviewed by Lidia Herrera PA-C.    Assessment/Plan   Allergies, medications, history, and pertinent labs/EKGs/Imaging reviewed by Lidia Herrera PA-C.     Orders and Diagnoses  Diagnoses and all orders for this visit:  Acute cystitis without hematuria  -     ciprofloxacin (Cipro) 500 mg tablet; Take 1 tablet (500 mg) by mouth 2 times a day for 7 days.  -     Urine Culture  Urinary urgency  -     POCT UA Automated manually resulted    Plan:  Med as above  Increase clear fluids-water, cranberry juice  Pcp follow up this week if not improving or worsening  ER visit anytime 24/7 for acute worsening or changing condition    Patient disposition: Home    Electronically signed by Lidia Herrera PA-C  2:25 PM

## 2024-12-02 LAB — BACTERIA UR CULT: NORMAL

## 2025-01-27 ENCOUNTER — APPOINTMENT (OUTPATIENT)
Dept: PRIMARY CARE | Facility: CLINIC | Age: 70
End: 2025-01-27

## 2025-02-03 ENCOUNTER — APPOINTMENT (OUTPATIENT)
Dept: PRIMARY CARE | Facility: CLINIC | Age: 70
End: 2025-02-03
Payer: MEDICARE

## 2025-03-11 ENCOUNTER — APPOINTMENT (OUTPATIENT)
Dept: PRIMARY CARE | Facility: CLINIC | Age: 70
End: 2025-03-11
Payer: MEDICARE

## 2025-03-19 ENCOUNTER — OFFICE VISIT (OUTPATIENT)
Dept: URGENT CARE | Age: 70
End: 2025-03-19
Payer: MEDICARE

## 2025-03-19 VITALS
HEART RATE: 88 BPM | OXYGEN SATURATION: 97 % | WEIGHT: 150 LBS | BODY MASS INDEX: 26.58 KG/M2 | RESPIRATION RATE: 18 BRPM | HEIGHT: 63 IN | DIASTOLIC BLOOD PRESSURE: 88 MMHG | SYSTOLIC BLOOD PRESSURE: 140 MMHG | TEMPERATURE: 98.8 F

## 2025-03-19 DIAGNOSIS — R05.1 ACUTE COUGH: ICD-10-CM

## 2025-03-19 DIAGNOSIS — J06.9 ACUTE URI: Primary | ICD-10-CM

## 2025-03-19 DIAGNOSIS — J02.9 SORE THROAT: ICD-10-CM

## 2025-03-19 LAB
POC RAPID INFLUENZA A: NEGATIVE
POC RAPID INFLUENZA B: NEGATIVE
POC RAPID STREP: NEGATIVE
POC SARS-COV-2 AG BINAX: NORMAL

## 2025-03-19 RX ORDER — GUAIFENESIN 1200 MG/1
1200 TABLET, EXTENDED RELEASE ORAL 2 TIMES DAILY
Qty: 14 TABLET | Refills: 0 | Status: SHIPPED | OUTPATIENT
Start: 2025-03-19 | End: 2025-03-26

## 2025-03-19 RX ORDER — LEVOCETIRIZINE DIHYDROCHLORIDE 5 MG/1
5 TABLET, FILM COATED ORAL EVERY EVENING
Qty: 30 TABLET | Refills: 0 | Status: SHIPPED | OUTPATIENT
Start: 2025-03-19 | End: 2025-04-18

## 2025-03-19 RX ORDER — BENZONATATE 200 MG/1
200 CAPSULE ORAL 3 TIMES DAILY PRN
Qty: 30 CAPSULE | Refills: 0 | Status: SHIPPED | OUTPATIENT
Start: 2025-03-19 | End: 2025-03-29

## 2025-03-19 ASSESSMENT — ENCOUNTER SYMPTOMS
SINUS PAIN: 0
BACK PAIN: 0
FATIGUE: 0
CONSTIPATION: 0
MYALGIAS: 0
ARTHRALGIAS: 0
DIZZINESS: 0
VOMITING: 0
NAUSEA: 0
ABDOMINAL PAIN: 0
DIARRHEA: 0
EYE PAIN: 0
APPETITE CHANGE: 0
TROUBLE SWALLOWING: 0
PALPITATIONS: 0
FEVER: 0
COUGH: 1
RHINORRHEA: 0
SORE THROAT: 1
NECK PAIN: 0
CHILLS: 0
HEADACHES: 0
WOUND: 0
DIFFICULTY URINATING: 0
SHORTNESS OF BREATH: 0
SINUS PRESSURE: 0
CHEST TIGHTNESS: 0

## 2025-03-19 ASSESSMENT — PATIENT HEALTH QUESTIONNAIRE - PHQ9
1. LITTLE INTEREST OR PLEASURE IN DOING THINGS: NOT AT ALL
SUM OF ALL RESPONSES TO PHQ9 QUESTIONS 1 AND 2: 0
2. FEELING DOWN, DEPRESSED OR HOPELESS: NOT AT ALL

## 2025-03-19 ASSESSMENT — VISUAL ACUITY: OU: 1

## 2025-03-19 NOTE — PROGRESS NOTES
"Subjective   Patient ID: Shannan Taveras \"Rich" is a 69 y.o. female. They present today with a chief complaint of Cough (Cough and sore throat for about 3 days. Nasal congestion).    History of Present Illness  Patient is a 68yo female who presents with cough and sore throat for about 3 days. Nasal congestion. Denies any sick contacts.           Cough  Associated symptoms include postnasal drip and a sore throat. Pertinent negatives include no chest pain, chills, ear pain, fever, headaches, myalgias, rash, rhinorrhea or shortness of breath.       Past Medical History  Allergies as of 2025 - Reviewed 2025   Allergen Reaction Noted    Penicillin Hives 2023    Sulfa (sulfonamide antibiotics) Hives 2023       (Not in a hospital admission)       Past Medical History:   Diagnosis Date    Abnormal Pap smear of cervix     2 cm cyst on L breast    Benign lipomatous neoplasm of skin and subcutaneous tissue of unspecified limb 2016    Lipoma of thigh    Depression     Encounter for full-term uncomplicated delivery      (spontaneous vaginal delivery)    Osteoporosis        Past Surgical History:   Procedure Laterality Date    BREAST BIOPSY      COLPOSCOPY      OTHER SURGICAL HISTORY  2013    Hemilaminectomy With Disc Removal One Lumbar Interspace    OTHER SURGICAL HISTORY  2013    Cervical Conization        reports that she has never smoked. She has never used smokeless tobacco. She reports that she does not currently use alcohol. She reports that she does not use drugs.    Review of Systems  Review of Systems   Constitutional:  Negative for appetite change, chills, fatigue and fever.   HENT:  Positive for congestion, postnasal drip and sore throat. Negative for dental problem, ear pain, hearing loss, rhinorrhea, sinus pressure, sinus pain, sneezing and trouble swallowing.    Eyes:  Negative for pain.   Respiratory:  Positive for cough. Negative for chest tightness and shortness of " "breath.    Cardiovascular:  Negative for chest pain and palpitations.   Gastrointestinal:  Negative for abdominal pain, constipation, diarrhea, nausea and vomiting.   Genitourinary:  Negative for difficulty urinating.   Musculoskeletal:  Negative for arthralgias, back pain, gait problem, myalgias and neck pain.   Skin:  Negative for rash and wound.   Neurological:  Negative for dizziness and headaches.   Psychiatric/Behavioral:  Negative for self-injury and suicidal ideas.                                   Objective    Vitals:    03/19/25 0810   BP: 140/88   Pulse: 88   Resp: 18   Temp: 37.1 °C (98.8 °F)   SpO2: 97%   Weight: 68 kg (150 lb)   Height: 1.6 m (5' 3\")     No LMP recorded. Patient is postmenopausal.    Physical Exam  Vitals reviewed.   Constitutional:       General: She is awake. She is not in acute distress.     Appearance: Normal appearance. She is well-developed and well-groomed. She is not ill-appearing.   HENT:      Head: Normocephalic and atraumatic.      Right Ear: Hearing, ear canal and external ear normal. Tympanic membrane is bulging.      Left Ear: Hearing, ear canal and external ear normal. Tympanic membrane is bulging.      Nose: Congestion present. No nasal deformity, signs of injury or rhinorrhea.      Mouth/Throat:      Lips: Pink.      Mouth: Mucous membranes are moist. No injury.      Dentition: Normal dentition.      Pharynx: Oropharynx is clear. Uvula midline. Postnasal drip present. No pharyngeal swelling, oropharyngeal exudate, posterior oropharyngeal erythema or uvula swelling.      Tonsils: No tonsillar exudate.   Eyes:      General: Lids are normal. Vision grossly intact. Gaze aligned appropriately.   Cardiovascular:      Rate and Rhythm: Normal rate and regular rhythm.      Heart sounds: Normal heart sounds, S1 normal and S2 normal. Heart sounds not distant. No murmur heard.     No friction rub. No gallop.   Pulmonary:      Effort: Pulmonary effort is normal. No tachypnea, " bradypnea, accessory muscle usage, prolonged expiration, respiratory distress or retractions.      Breath sounds: Normal breath sounds and air entry. No stridor, decreased air movement or transmitted upper airway sounds. No decreased breath sounds, wheezing, rhonchi or rales.   Lymphadenopathy:      Head:      Right side of head: No submental, submandibular, tonsillar, preauricular, posterior auricular or occipital adenopathy.      Left side of head: No submental, submandibular, tonsillar, preauricular, posterior auricular or occipital adenopathy.      Cervical:      Right cervical: No superficial cervical adenopathy.     Left cervical: No superficial cervical adenopathy.   Skin:     General: Skin is warm and dry.      Capillary Refill: Capillary refill takes less than 2 seconds.   Neurological:      General: No focal deficit present.      Mental Status: She is alert.      Gait: Gait is intact.   Psychiatric:         Attention and Perception: Attention and perception normal.         Mood and Affect: Mood and affect normal.         Speech: Speech normal.         Behavior: Behavior normal. Behavior is cooperative.         Procedures    Point of Care Test & Imaging Results from this visit  Results for orders placed or performed in visit on 03/19/25   POCT Covid-19 Rapid Antigen   Result Value Ref Range    POC KENNEDY-COV-2 AG  Presumptive negative test for SARS-CoV-2 (no antigen detected)     Presumptive negative test for SARS-CoV-2 (no antigen detected)   POCT Influenza A/B manually resulted   Result Value Ref Range    POC Rapid Influenza A Negative Negative    POC Rapid Influenza B Negative Negative   POCT rapid strep A manually resulted   Result Value Ref Range    POC Rapid Strep Negative Negative      No results found.    Diagnostic study results (if any) were reviewed by LUCIA Poon-WINSOME.    Assessment/Plan   Allergies, medications, history, and pertinent labs/EKGs/Imaging reviewed by Jennifer Parham  LUCIA Lowe-CNP.     Medical Decision Making  Negative for flu, covid and strep. Physical exam and duration of symptoms consistent with a viral illness. Advise symptomatic care including warm salt water gargles, humidified air, rest, fluids, evening antihistamine in addtion to the medication regimen detailed below.     Risks, benefits, and alternatives of the medications and treatment plan prescribed today were discussed, and patient expressed understanding. Plan follow up as discussed or as needed if any worsening symptoms or change in condition. Reinforced red flags including (but not limited to): severe or worsening abdominal pain; difficulty swallowing; stiff neck; shortness of breath; coughing or vomiting blood; chest pain; and new or increased fever are indications to go to the Emergency Department.    The patient voices understanding of all medications. No barriers to adherence. Patient is taking all medications as prescribed and tolerating well. For any new medications, the patient was instructed of directions for and consequences of not taking medication and they were informed about the potential side effects and drug interactions. The after-visit summary was given to the patient and care instructions were reviewed with the patient. All questions were answered and the patient verbalized understanding of the plan of care for today.    Orders and Diagnoses  Diagnoses and all orders for this visit:  Acute URI  -     benzonatate (Tessalon) 200 mg capsule; Take 1 capsule (200 mg) by mouth 3 times a day as needed for cough for up to 10 days. Do not crush or chew.  -     levocetirizine (Xyzal) 5 mg tablet; Take 1 tablet (5 mg) by mouth once daily in the evening.  -     guaiFENesin (Mucinex) 1,200 mg tablet extended release 12hr; Take 1 tablet (1,200 mg) by mouth 2 times a day for 7 days.  Sore throat  -     POCT Covid-19 Rapid Antigen  -     POCT Influenza A/B manually resulted  -     POCT rapid strep A manually  resulted  Acute cough  -     benzonatate (Tessalon) 200 mg capsule; Take 1 capsule (200 mg) by mouth 3 times a day as needed for cough for up to 10 days. Do not crush or chew.  -     guaiFENesin (Mucinex) 1,200 mg tablet extended release 12hr; Take 1 tablet (1,200 mg) by mouth 2 times a day for 7 days.      Medical Admin Record      Patient disposition: Home    Electronically signed by JESUS Poon  8:54 AM

## 2025-04-06 NOTE — PROGRESS NOTES
MEDICARE WELLNESS        HPI:  Shannan Taveras is a 69 y.o. female here  for a  Medicare Wellness Visit, Physical Exam, and Follow up for multiple health issues.     EMR/EPIC records reviewed.     Last by me on 3/8/24 for  FOLLOW-UP VISIT TO DISCUSS and REVIEW TEST RESULTS.  At visit:    Hyperlipidemia: elevated cholesterol: total cholesterol 257 and LDL cholesterol 167. ASCVD risk (5.4%) borderline.  -discussed with patient recommendation to start atorvastatin 20 mg daily; patient declined starting atorvastatin at this time  - low cholesterol, low fat diet, regularly exercise, and limit alcohol intake.   -CT Heart Ca scoring previously ordered     Elevated bilirubin (1.3) and potassium (5.2).  -previously ordered repeat  CMP==> NOT YET COMPLETED==> patient advised to please go to [facility] to have drawn        Left sided neck rash: no signs of cellulitis: ? Eczema vs fungal skin rash. Per patient ketoconazole worsened rash and is not interested in trial of steroid cream  -referral to dermatology ordered  -emergency dept precautions discussed and reviewed with patient        Severe Osteoporosis; DEXA 2/9/23  -patient declined referral to rheumatology ordered  -cont OTC Calcium 1,200 mg daily broken up as 400 mg three times a day with meals and Vit D3 2,000 units a day  -discussed bisphonates therapy (alendronate),including the benefits (decrease risk of fracture and reduced bone loss), risks (AVN femoral head), and alternatives (calcium,/weight bearing exercise, etc) to therapy.  Patient not interested in starting aldendronate/bisphonates therapy at this time.  -regular exercise that includes weight bearing exercise, and limit alcohol intake     Strong Family history of breast cancer  -referral to genetics ordered on 2/2/24 for breast cancer genetic testing==> patient reports that she will call to schedule      Vitamin D deficiency  -cont over the counter Vitamin D3 2,000 units daily  -will plan to repeat Vit D  levels in 3 months; next due 5/2024      Breast Cancer Screening: MAMMOGRAM NOW DUE   -mammogram ordered 2/2/24==> NOT YET COMPLETED==> patient advised to please call radiology to schedule     Cervical Cancer Screening; last pap smear 2/8/21- Negative for intraepithelial lesion or malignancy.   -cont pap smears per GYN     Heart Disease Screening:  -CT Heart Ca scoring ordered 2/2/24==>NOT YET COMPLETED==> patient advised to please call radiology to schedule        Colon Cancer Screening: last colonoscopy 2018 per patient no polyps==> NEXT DUE 10 years ( next due 2028)  -next due 2028        Immunizations Counseling  -flu vaccine and shingles now due==> Patient DECLINED 2/2/24 and today 3/8/24  -recommend updated COVID spike and RSV vaccine that can be obtained at local pharmacy     FOLLOW-UP: 4 weeks to discuss and review test results (mammogram, CT heart Ca scoring and repeat CMP) and 8 weeks for MEDICARE WELLNESS VISIT/ PHYSICAL         PMHx:  -hyperlipidemia; , ; 2/21/22  -severe osteoporosis; DEXA 2/9/23; patient opposed to starting aldendronate/bisphonate therapy  -GERD  -Increase lifetime risk of breast cancer; multiple family members with breast cancer  -elevated hemoglobin (15.5) and hemactocrit (44.9); 1/28/23  -elevated bilirubin (1.3); 1/28/2023  -history of abormal pap smears; undergoes yearly paps; per OBGYN continue screens x 10 yrs past last [id number]  -Vit D deficiency  -right sciatica     Healthcare Providers:  Cardiology: Omari Macario MD   GYN: Catina Barcenas;  Prior PCP: Dr. Garcia; last seen 1/27/23 for a Medicare Physical        Preventive Health Services:  -Last physical/Medicare wellness Visit: 4/9/25  -last pap: 6/6/24 Negative for intraepithelial lesion or malignancy.   -last mammogram: 3/22/24 WNL==> NOW DUE  -last colonoscopy: 6 years ago per patient no polyps==> NEXT DUE 10 years; per patient next due 2028  -last DEXA 2/9/23: severe osteoporosis==> DEXA NOW DUE  -last  STI screening: negative HIV, syphilis 2/2024  -Hep C: negative 2/2024     DEXA 2/9/23:  IMPRESSION:  Lumbar spine evaluation demonstrates severe osteopenia.     The overall hip evaluation demonstrates severe osteopenia with focal  significant osteoporosis of the femoral neck region.     The VFA images demonstrate no obvious fracture.     Immunizations:   -Childhood vaccines: completed per patient  -shingles: NOW DUE==>DECLINED  -Flu vaccine: NOW DUE==> DECLINED  -RSV vaccine: NOW DUE==> DECLINED  -updated COVID vaccine: ==> DECLINED        Immunization History   Administered Date(s) Administered    Moderna SARS-CoV-2 Vaccination 03/10/2021, 04/09/2021    Pneumococcal conjugate vaccine, 20-valent (PREVNAR 20) 01/27/2023    Tdap vaccine, age 7 year and older (BOOSTRIX, ADACEL) 01/01/2009, 05/18/2009, 01/21/2021          INTERVAL HISTORY:  -saw GYN for pap smear and cardiology for evaluation of elevated coronary Ca score who recommended starting crestor 10 mg daily        Today Shannan reports:     - doing and feeling well.      -has been very busy at work and is looking forward to retiring in June 2024 and is very excited.     -has not yet scheduled appointment with genetic testing  for breast cancer, and  dermatology but she will       Today she has no reported complaints, issues, or problems.     ROS is NEG for HEADACHE, NAUSEA, VOMITING, DIARRHEA, CHEST PAIN, SOB, and BLEEDING.  Review of systems is negative for all systems except for any identified issues in HPI above.     Last sexually active 10/2023 (no condoms).  Denies history STIs.        SHx:  -lives alone with her cat  -employment: admin part time, [employer]  -tobacco use: NEVER  -alcohol use: 1 beer yearly  -illicits: DENIES        FHx:  Cancer:  -breast and ovarian cancer: mother diagnosed at age 60s;   -breast cancer: maternal aunts, cousins x 2 (diagnosed 40s)   -prostate: father passed of PCa  HTN: mother  DM: DENIES  Heart Disease:  DENIES  Stroke: DENIES  Thyroid Disease: DENIES     Advanced Care Planning:  -Advanced Directive: YES  -Code Status:  -Health Care POA: Yes sister-in-law, Rochelle Treviño, is DPOA, brother, Manny Treviño is second      Health Maintenance    Social History:  Tobacco: never  EtOH: rarely  Patient reports routine vision checks and dental cleanings, and regular exercise.     Advanced Directives:  Patient DOES have advanced directives in place.  Counseled and discussed importance with patient during visit today.  Provided assistance as necessary.    Opioid Medications:  Does the patient use opioid medications: NO    Overall Health:  How does the patient rate their health status today:  good     Cognitive Screen:  AAAx3  to person, place and time: Yes  3 word recall: Banana, Chair, Sunrise  - Immediate recall: Yes  - 5 minutes recall: Yes  Impression: No cognitive deficiency observed during screening or encounter today     Vision/Hearing Screen:  Vision (required for WELCOME TO MEDICARE VISIT ONLY):  NA  Hearing screen: reports no difficulty with hearing and passes finger rub test bilaterally      Immunizations:   -Childhood vaccines: completed per patient  -shingles: NOW DUE==>DECLINED  -Flu vaccine: NOW DUE==> DECLINED  -RSV vaccine: NOW DUE==> DECLINED  -updated COVID vaccine: ==> DECLINED     Immunization History   Administered Date(s) Administered    Moderna SARS-CoV-2 Vaccination 03/10/2021, 04/09/2021    Pneumococcal conjugate vaccine, 20-valent (PREVNAR 20) 01/27/2023    Tdap vaccine, age 7 year and older (BOOSTRIX, ADACEL) 01/01/2009, 05/18/2009, 01/21/2021         Preventive Health Services and Screenings:  -Last physical/Medicare wellness Visit: 4/9/25  -last pap: 6/6/24 Negative for intraepithelial lesion or malignancy.   -last mammogram: 3/22/24 WNL==> NOW DUE  -last colonoscopy: 6 years ago per patient no polyps==> NEXT DUE 10 years; per patient next due 2028  -last DEXA 2/9/23: severe osteoporosis==> DEXA NOW  DUE  -last STI screening: negative HIV, syphilis 2/2024  -Hep C: negative 2/2024     DEXA 2/9/23:  IMPRESSION:  Lumbar spine evaluation demonstrates severe osteopenia.     The overall hip evaluation demonstrates severe osteopenia with focal  significant osteoporosis of the femoral neck region.     The VFA images demonstrate no obvious fracture.        Screening Pap due 21-65, Q3, Q5 with nml HPV after 29 y/o  Screening Mammogram :  yearly ages 40-75, shared decision making age >75  Screening Colonoscopy:  age 45-75, shared decision making age >75  DEXA scan 65 or 60 with risks, [id number] after  AAA screen men 65-75 men with ANY smoking history  Low dose CT of lungs:  yearly for 50-80 with at least 20 year pack history.  Current smokers and those who quit <15 years ago.  Coronary Ca score men >45, women >55,   Hep C screen:  one time all adults age 18-79        Reviewed:   Past Medical History/Allergies:  Yes  Family History:  Yes  Social History:  Yes  Current Medications:  Yes  Vital Signs:  Yes  Advanced Directives:  discussed  Immunizations:  reviewed today  Home Safety:                    Up & Go test > 30 seconds?  No                   Home have rugs; lack grab bars in bathroom; lack handrail on stairs; have poor lighting?  No                   Hearing difficulties?  No  Geriatric Assessment           ADL areas requiring assistance:  Does not need help with , Dressing, Eating, Ambulating, Toileting, Grooming, Hygiene.            IADL areas requiring assistance:  Does not need help with , Shopping, Housework, Accounting, Transportation, Driving.   Medications reviewed           Current supplements  Reviewed and recorded.   Other providers           Reviewed and recorded  Current providers and suppliers:     PHQ9/GAD7:         Current Medications  Current Outpatient Medications   Medication Instructions    calcium carbonate (CALCIUM 600 ORAL) 1 tablet, Daily    cholecalciferol (VITAMIN D3) 2,000 Units, Daily     rosuvastatin (CRESTOR) 10 mg, oral, Daily        History  Allergies   Allergen Reactions    Penicillin Hives    Sulfa (Sulfonamide Antibiotics) Hives      Past Medical History:   Diagnosis Date    Abnormal Pap smear of cervix     2 cm cyst on L breast    Benign lipomatous neoplasm of skin and subcutaneous tissue of unspecified limb 2016    Lipoma of thigh    Depression     Encounter for full-term uncomplicated delivery      (spontaneous vaginal delivery)    Osteoporosis       Past Surgical History:   Procedure Laterality Date    BREAST BIOPSY      COLPOSCOPY      OTHER SURGICAL HISTORY  2013    Hemilaminectomy With Disc Removal One Lumbar Interspace    OTHER SURGICAL HISTORY  2013    Cervical Conization     Family History   Problem Relation Name Age of Onset    Breast cancer Mother Maureen     Ovarian cancer Mother Maureen     Prostate cancer Father Greyson     Cancer Father Greyson     Dementia Brother      Other (gastric bypass) Brother      No Known Problems Brother      No Known Problems Son      No Known Problems Son      No Known Problems Son      Breast cancer Mother's Sister Aunt Lucina      Social History     Tobacco Use    Smoking status: Never     Passive exposure: Current    Smokeless tobacco: Never   Vaping Use    Vaping status: Never Used   Substance Use Topics    Alcohol use: Not Currently    Drug use: Never     Tobacco Use: Medium Risk (2025)    Patient History     Smoking Tobacco Use: Never     Smokeless Tobacco Use: Never     Passive Exposure: Current        ROS  All pertinent positive symptoms are included in the history of present illness.  All other systems have been reviewed and are negative and noncontributory to this patient's current ailments.    VITAL SIGNS  Vitals:    25 1126   BP: 122/86   Pulse: 78   Temp: 36.6 °C (97.9 °F)   SpO2: 96%     Vitals:    25 1126   Weight: 72.2 kg (159 lb 3.2 oz)      Body mass index is 28.2 kg/m².     PHYSICAL  EXAM    GENERAL  Well-appearing, pleasant and cooperative.  No acute distress.    HEENT  HEAD:   Normocephalic.  Atraumatic.  EYES:  PERRLA.  No scleral icterus or conjunctival injection.  EARS:  Tympanic membranes visualized bilaterally without erythema, fluid, or bulging.  NECK:  No adenopathy.  No palpable thyroid enlargement or nodules.    THROAT:  Moist oropharynx without tonsillar enlargement or exudates.    LUNGS:    Clear to auscultation bilaterally.  No wheezes, rales, rhonchi.    CARDIAC:  Regular rate and rhythm.  Normal S1S2.  No murmurs/rubs/gallops.    ABDOMEN:  Soft, non-tender, non-distended.  No hepatosplenomegaly.  Normoactive bowel sounds.    MUSCULOSKELETAL:  No gross abnormalities.   No joint swelling or erythema,.  No spinal or paraspinal tenderness to palpation.    EXTREMITIES:  No LE edema or cyanosis.      NEURO           Alert and oriented x3. No focal deficits.    PSYCH:          Affect appropriate.     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    Relevant Orders    Referral to Genetics    Vitamin D deficiency    Relevant Orders    Vitamin D 25-Hydroxy,Total (for eval of Vitamin D levels)     Other Visit Diagnoses       Medicare annual wellness visit, subsequent    -  Primary    Depression screening        Advanced care planning/counseling discussion        Cardiac risk counseling        Mixed hyperlipidemia        Relevant Orders    Lipid Panel    Encounter for screening mammogram for malignant neoplasm of breast        Family history of breast cancer        Relevant Orders    Referral to Genetics    Age related osteoporosis, unspecified pathological fracture presence        Annual physical exam        Relevant Orders    TSH with reflex  to Free T4 if abnormal    Urinalysis with Reflex Microscopic    Comprehensive Metabolic Panel    CBC and Auto Differential    Breast cancer screening by mammogram        Relevant Orders    BI mammo bilateral screening tomosynthesis    Elevated bilirubin        Relevant Orders    Urinalysis with Reflex Microscopic    Comprehensive Metabolic Panel    Other fatigue        Relevant Orders    CBC and Auto Differential    Coronary artery disease involving native coronary artery of native heart without angina pectoris        Relevant Medications    rosuvastatin (Crestor) 10 mg tablet          Medicare Wellness Visit and Annual Physical: completed today      Hyperlipidemia, elevated ASCVD Risk score, and elevated coronary ca score (248): elevated cholesterol: total cholesterol 257 and LDL cholesterol 167. ASCVD risk (8.3%). + elevated coronary Ca score = 248. Cardiology following who prescribed crestor 10 mg daily  -discussed with patient recommendation to start atorvastatin 20 mg daily; patient declined starting atorvastatin at this time  -lipid panel ordered  -cont crestor 10 mg daily  -low cholesterol, low fat diet, regularly exercise, and limit alcohol intake  -cont management by cardiology  -emergency Dept and 911/EMS cardiac precautions discussed and reviewed with patient     Elevated bilirubin (1.6)  -CMP ordered ; if bilirubin remains elevated will refer to GI for evaluation       Left sided neck rash==RESOLVED: no signs of cellulitis: ? Eczema vs fungal skin rash. Per patient ketoconazole worsened rash and is not interested in trial of steroid cream  -referral to dermatology ordered  -emergency dept precautions discussed and reviewed with patient        Severe Osteoporosis; DEXA 2/9/23  -patient declined referral to rheumatology that had been previously ordered  -cont OTC Calcium 1,200 mg daily broken up as 400 mg three times a day with meals and Vit D3 2,000 units a day  -discussed bisphonates therapy  (alendronate),including the benefits (decrease risk of fracture and reduced bone loss), risks (AVN femoral head), and alternatives (calcium,/weight bearing exercise, etc) to therapy.  Patient not interested in starting aldendronate/bisphonates therapy at this time.  -regular exercise that includes weight bearing exercise, and limit alcohol intake  -DEXA ordered     Strong Family history of breast cancer  -referral to genetics ordered on 2/2/24 for breast cancer genetic testing==> re-ordered today==>patient reports that she will call to schedule      Vitamin D deficiency  -Vit D levels ordered  -cont over the counter Vitamin D3 2,000 units daily  -will plan to repeat Vit D levels in 3 months; next due 5/2024      Breast Cancer Screening: MAMMOGRAM NOW DUE   -mammogram ordered     Cervical Cancer Screening; UTD.   -cont pap smears per GYN        Colon Cancer Screening: last colonoscopy 2018 per patient no polyps==> NEXT DUE 10 years ( next due 2028)  -next due 2028    Depression Screening  Depression screening completed using the PHQ-2 questions with results documented in the chart/encounter (15min)  (See Rooming Screening section for documentation, and/or progress note for additional information)     Cardiac Risk Assessment:  Cardiovascular risk was discussed and ,if needed, lifestyle modifications recommended, including nutritional choices, exercise, and elimination of habits contributing to risk.  We agreed on a plan to reduce the current cardiovascular risk based on above discussion as needed.  Aspirin use/disuse was discussed and documented in the Problem List of the medical record (under Cardiac Risk Counseling) after reviewing the updated guidelines below:  Consider low dose Aspirin ( mg) use if the benefit for cardiovascular disease prevention outweighs risk for bleeding complications.  In general, low dose ASA should be considered:  In patients WITHOUT prior MI/stroke/PAD (primary prevention):  a.Age  <60: Use if 10-year cardiovascular disease risk >20%, with discussion of risks and benefits with patient  b.Age 60-<70: Use if 10-year cardiovascular disease risk >20% and low bleeding (e.g., gastrointestinal) risk, with discussion of risks and benefits with patient  c.Age >=70: Do not use  In patients WITH prior MI/stroke/PAD (secondary prevention):  Generally use unless extremely high bleeding (e.g., gastrointestinal) risk, with discussion of risks and benefits with patient  (~16 min spent discussing above)     Advance Directives Discussion  Advanced Care Planning (including a Living Will, Healthcare POA, as well as specific end of life choices and/or directives), was discussed with the patient and/or surrogate, voluntarily, and details of that discussion documented in the Problem List (under Advanced Directives Discussion) of the medical record.  (~16 min spent discussing above)       Counseling:       Medication education:         Education:  The patient is counseled regarding potential side-effects of all new medications        Understanding:  Patient expressed understanding        Adherence:  No barriers to adherence identified        Immunizations Counseling  -shingles now due==> Patient DECLINED  -recommend updated COVID spike, shingles, and RSV vaccine that can be obtained at local pharmacy     FOLLOW-UP: 4 weeks to discuss and review test results      I have personally reviewed all available pertinent labs, imaging, and consult notes with the patient.    Discussed recommended plan of care with patient. Patient expressed understanding and agreement with plan of care. All of patient's questions were answered at the time. Patient had no additional questions at the time.               Lynnette Suarez MD, PhD

## 2025-04-09 ENCOUNTER — OFFICE VISIT (OUTPATIENT)
Dept: PRIMARY CARE | Facility: CLINIC | Age: 70
End: 2025-04-09
Payer: MEDICARE

## 2025-04-09 VITALS
DIASTOLIC BLOOD PRESSURE: 86 MMHG | SYSTOLIC BLOOD PRESSURE: 122 MMHG | HEART RATE: 78 BPM | WEIGHT: 159.2 LBS | TEMPERATURE: 97.9 F | HEIGHT: 63 IN | OXYGEN SATURATION: 96 % | BODY MASS INDEX: 28.21 KG/M2

## 2025-04-09 DIAGNOSIS — Z00.00 MEDICARE ANNUAL WELLNESS VISIT, SUBSEQUENT: Primary | ICD-10-CM

## 2025-04-09 DIAGNOSIS — R53.83 OTHER FATIGUE: ICD-10-CM

## 2025-04-09 DIAGNOSIS — M81.0 AGE-RELATED OSTEOPOROSIS WITHOUT CURRENT PATHOLOGICAL FRACTURE: ICD-10-CM

## 2025-04-09 DIAGNOSIS — Z12.31 BREAST CANCER SCREENING BY MAMMOGRAM: ICD-10-CM

## 2025-04-09 DIAGNOSIS — E55.9 VITAMIN D DEFICIENCY: ICD-10-CM

## 2025-04-09 DIAGNOSIS — I25.10 CORONARY ARTERY DISEASE INVOLVING NATIVE CORONARY ARTERY OF NATIVE HEART WITHOUT ANGINA PECTORIS: ICD-10-CM

## 2025-04-09 DIAGNOSIS — Z80.3 FAMILY HISTORY OF BREAST CANCER: ICD-10-CM

## 2025-04-09 DIAGNOSIS — Z15.01 GENETIC SUSCEPTIBILITY TO MALIGNANT NEOPLASM OF BREAST: ICD-10-CM

## 2025-04-09 DIAGNOSIS — M81.0 AGE RELATED OSTEOPOROSIS, UNSPECIFIED PATHOLOGICAL FRACTURE PRESENCE: ICD-10-CM

## 2025-04-09 DIAGNOSIS — Z71.89 ADVANCED CARE PLANNING/COUNSELING DISCUSSION: ICD-10-CM

## 2025-04-09 DIAGNOSIS — Z71.89 CARDIAC RISK COUNSELING: ICD-10-CM

## 2025-04-09 DIAGNOSIS — Z12.31 ENCOUNTER FOR SCREENING MAMMOGRAM FOR MALIGNANT NEOPLASM OF BREAST: ICD-10-CM

## 2025-04-09 DIAGNOSIS — Z00.00 ANNUAL PHYSICAL EXAM: ICD-10-CM

## 2025-04-09 DIAGNOSIS — E78.2 MIXED HYPERLIPIDEMIA: ICD-10-CM

## 2025-04-09 DIAGNOSIS — Z13.31 DEPRESSION SCREENING: ICD-10-CM

## 2025-04-09 DIAGNOSIS — R17 ELEVATED BILIRUBIN: ICD-10-CM

## 2025-04-09 PROCEDURE — 99497 ADVNCD CARE PLAN 30 MIN: CPT | Performed by: FAMILY MEDICINE

## 2025-04-09 PROCEDURE — 1159F MED LIST DOCD IN RCRD: CPT | Performed by: FAMILY MEDICINE

## 2025-04-09 PROCEDURE — 99214 OFFICE O/P EST MOD 30 MIN: CPT | Mod: 25 | Performed by: FAMILY MEDICINE

## 2025-04-09 PROCEDURE — G0439 PPPS, SUBSEQ VISIT: HCPCS | Performed by: FAMILY MEDICINE

## 2025-04-09 PROCEDURE — 1126F AMNT PAIN NOTED NONE PRSNT: CPT | Performed by: FAMILY MEDICINE

## 2025-04-09 PROCEDURE — 3008F BODY MASS INDEX DOCD: CPT | Performed by: FAMILY MEDICINE

## 2025-04-09 PROCEDURE — G2211 COMPLEX E/M VISIT ADD ON: HCPCS | Performed by: FAMILY MEDICINE

## 2025-04-09 PROCEDURE — 99214 OFFICE O/P EST MOD 30 MIN: CPT | Performed by: FAMILY MEDICINE

## 2025-04-09 PROCEDURE — 1036F TOBACCO NON-USER: CPT | Performed by: FAMILY MEDICINE

## 2025-04-09 PROCEDURE — 1160F RVW MEDS BY RX/DR IN RCRD: CPT | Performed by: FAMILY MEDICINE

## 2025-04-09 PROCEDURE — 99397 PER PM REEVAL EST PAT 65+ YR: CPT | Performed by: FAMILY MEDICINE

## 2025-04-09 PROCEDURE — 99397 PER PM REEVAL EST PAT 65+ YR: CPT | Mod: 25 | Performed by: FAMILY MEDICINE

## 2025-04-09 PROCEDURE — G0444 DEPRESSION SCREEN ANNUAL: HCPCS | Performed by: FAMILY MEDICINE

## 2025-04-09 PROCEDURE — 1157F ADVNC CARE PLAN IN RCRD: CPT | Performed by: FAMILY MEDICINE

## 2025-04-09 PROCEDURE — G0446 INTENS BEHAVE THER CARDIO DX: HCPCS | Performed by: FAMILY MEDICINE

## 2025-04-09 PROCEDURE — 99215 OFFICE O/P EST HI 40 MIN: CPT | Performed by: FAMILY MEDICINE

## 2025-04-09 PROCEDURE — 99497 ADVNCD CARE PLAN 30 MIN: CPT | Mod: 33,25 | Performed by: FAMILY MEDICINE

## 2025-04-09 RX ORDER — ROSUVASTATIN CALCIUM 10 MG/1
10 TABLET, COATED ORAL DAILY
Qty: 90 TABLET | Refills: 3 | Status: SHIPPED | OUTPATIENT
Start: 2025-04-09 | End: 2026-04-09

## 2025-04-09 RX ORDER — MV-MIN/VIT C/GLUT/LYSINE/HB124 250-12.5MG
TABLET,CHEWABLE ORAL
COMMUNITY
End: 2025-04-09 | Stop reason: WASHOUT

## 2025-04-09 ASSESSMENT — COLUMBIA-SUICIDE SEVERITY RATING SCALE - C-SSRS
1. IN THE PAST MONTH, HAVE YOU WISHED YOU WERE DEAD OR WISHED YOU COULD GO TO SLEEP AND NOT WAKE UP?: NO
2. HAVE YOU ACTUALLY HAD ANY THOUGHTS OF KILLING YOURSELF?: NO
6. HAVE YOU EVER DONE ANYTHING, STARTED TO DO ANYTHING, OR PREPARED TO DO ANYTHING TO END YOUR LIFE?: NO

## 2025-04-09 ASSESSMENT — PATIENT HEALTH QUESTIONNAIRE - PHQ9
2. FEELING DOWN, DEPRESSED OR HOPELESS: NOT AT ALL
1. LITTLE INTEREST OR PLEASURE IN DOING THINGS: NOT AT ALL
SUM OF ALL RESPONSES TO PHQ9 QUESTIONS 1 AND 2: 0

## 2025-04-09 ASSESSMENT — PAIN SCALES - GENERAL: PAINLEVEL_OUTOF10: 0-NO PAIN

## 2025-04-15 LAB
25(OH)D3+25(OH)D2 SERPL-MCNC: 36 NG/ML (ref 30–100)
ALBUMIN SERPL-MCNC: 4.3 G/DL (ref 3.6–5.1)
ALP SERPL-CCNC: 83 U/L (ref 37–153)
ALT SERPL-CCNC: 11 U/L (ref 6–29)
ANION GAP SERPL CALCULATED.4IONS-SCNC: 14 MMOL/L (CALC) (ref 7–17)
APPEARANCE UR: CLEAR
AST SERPL-CCNC: 15 U/L (ref 10–35)
BACTERIA #/AREA URNS HPF: ABNORMAL /HPF
BASOPHILS # BLD AUTO: 22 CELLS/UL (ref 0–200)
BASOPHILS NFR BLD AUTO: 0.4 %
BILIRUB SERPL-MCNC: 1.6 MG/DL (ref 0.2–1.2)
BILIRUB UR QL STRIP: NEGATIVE
BUN SERPL-MCNC: 19 MG/DL (ref 7–25)
CALCIUM SERPL-MCNC: 9.4 MG/DL (ref 8.6–10.4)
CHLORIDE SERPL-SCNC: 105 MMOL/L (ref 98–110)
CHOLEST SERPL-MCNC: 199 MG/DL
CHOLEST/HDLC SERPL: 2.8 (CALC)
CO2 SERPL-SCNC: 23 MMOL/L (ref 20–32)
COLOR UR: YELLOW
CREAT SERPL-MCNC: 0.62 MG/DL (ref 0.5–1.05)
EGFRCR SERPLBLD CKD-EPI 2021: 98 ML/MIN/1.73M2
EOSINOPHIL # BLD AUTO: 160 CELLS/UL (ref 15–500)
EOSINOPHIL NFR BLD AUTO: 2.9 %
ERYTHROCYTE [DISTWIDTH] IN BLOOD BY AUTOMATED COUNT: 12.9 % (ref 11–15)
GLUCOSE SERPL-MCNC: 78 MG/DL (ref 65–99)
GLUCOSE UR QL STRIP: NEGATIVE
HCT VFR BLD AUTO: 43.4 % (ref 35–45)
HDLC SERPL-MCNC: 72 MG/DL
HGB BLD-MCNC: 14.6 G/DL (ref 11.7–15.5)
HGB UR QL STRIP: ABNORMAL
HYALINE CASTS #/AREA URNS LPF: ABNORMAL /LPF
KETONES UR QL STRIP: NEGATIVE
LDLC SERPL CALC-MCNC: 106 MG/DL (CALC)
LEUKOCYTE ESTERASE UR QL STRIP: ABNORMAL
LYMPHOCYTES # BLD AUTO: 1914 CELLS/UL (ref 850–3900)
LYMPHOCYTES NFR BLD AUTO: 34.8 %
MCH RBC QN AUTO: 29.6 PG (ref 27–33)
MCHC RBC AUTO-ENTMCNC: 33.6 G/DL (ref 32–36)
MCV RBC AUTO: 88 FL (ref 80–100)
MONOCYTES # BLD AUTO: 451 CELLS/UL (ref 200–950)
MONOCYTES NFR BLD AUTO: 8.2 %
NEUTROPHILS # BLD AUTO: 2954 CELLS/UL (ref 1500–7800)
NEUTROPHILS NFR BLD AUTO: 53.7 %
NITRITE UR QL STRIP: NEGATIVE
NONHDLC SERPL-MCNC: 127 MG/DL (CALC)
PH UR STRIP: 6 [PH] (ref 5–8)
PLATELET # BLD AUTO: 206 THOUSAND/UL (ref 140–400)
PMV BLD REES-ECKER: 10.9 FL (ref 7.5–12.5)
POTASSIUM SERPL-SCNC: 4.6 MMOL/L (ref 3.5–5.3)
PROT SERPL-MCNC: 7.2 G/DL (ref 6.1–8.1)
PROT UR QL STRIP: ABNORMAL
RBC # BLD AUTO: 4.93 MILLION/UL (ref 3.8–5.1)
RBC #/AREA URNS HPF: ABNORMAL /HPF
SERVICE CMNT-IMP: ABNORMAL
SODIUM SERPL-SCNC: 142 MMOL/L (ref 135–146)
SP GR UR STRIP: 1.02 (ref 1–1.03)
SQUAMOUS #/AREA URNS HPF: ABNORMAL /HPF
TRIGL SERPL-MCNC: 109 MG/DL
TSH SERPL-ACNC: 1.21 MIU/L (ref 0.4–4.5)
WBC # BLD AUTO: 5.5 THOUSAND/UL (ref 3.8–10.8)
WBC #/AREA URNS HPF: ABNORMAL /HPF

## 2025-04-23 ENCOUNTER — HOSPITAL ENCOUNTER (OUTPATIENT)
Dept: RADIOLOGY | Facility: HOSPITAL | Age: 70
Discharge: HOME | End: 2025-04-23
Payer: MEDICARE

## 2025-04-23 DIAGNOSIS — M81.0 AGE-RELATED OSTEOPOROSIS WITHOUT CURRENT PATHOLOGICAL FRACTURE: ICD-10-CM

## 2025-04-23 DIAGNOSIS — Z12.31 BREAST CANCER SCREENING BY MAMMOGRAM: ICD-10-CM

## 2025-04-23 PROCEDURE — 77080 DXA BONE DENSITY AXIAL: CPT

## 2025-04-23 PROCEDURE — 77063 BREAST TOMOSYNTHESIS BI: CPT | Performed by: RADIOLOGY

## 2025-04-23 PROCEDURE — 77067 SCR MAMMO BI INCL CAD: CPT | Performed by: RADIOLOGY

## 2025-04-23 PROCEDURE — 77080 DXA BONE DENSITY AXIAL: CPT | Performed by: RADIOLOGY

## 2025-04-23 PROCEDURE — 77063 BREAST TOMOSYNTHESIS BI: CPT

## 2025-06-04 ENCOUNTER — APPOINTMENT (OUTPATIENT)
Dept: PRIMARY CARE | Facility: CLINIC | Age: 70
End: 2025-06-04
Payer: MEDICARE

## 2025-07-09 NOTE — PROGRESS NOTES
Assessment/Plan   Problem List Items Addressed This Visit    None  Visit Diagnoses         Well woman exam    -  Primary      Encounter for screening mammogram for malignant neoplasm of breast                 Lidia Friedman, APRN-CNM     Subjective   The patient is here for a well woman exam.    She is postmenopausal and denies any PMB  She has been menopausal (natural) menopause for 18 years  She reports vasomotor symptoms: yes - only if stressed    Last pap 2024 NEG/NEG, pap prior 2021 NEG/NEG  Hx HSIL: No  Mamm due 4-2026      Review of Systems    Objective :  see vital signs and BMI      General:   Alert and oriented x 3   Heart:  Thyroid: Regular rate, rhythm  Euthyroid, normal shape and size   Lungs:  Breast: Clear to auscultation bilaterally  Symmetrical, no skin changes/nipple discharge, redness, tenderness, no masses palpated bilaterally   Abdomen: Soft, non tender   Vulva: EGBUS normal   Vagina: Atrophic, normal discharge   Cervix: No CMT   Uterus: Normal shape, size   Adnexa: NT bilaterally       Thank you for coming to see me for your visit today and most importantly thank you for having a preventative visit.  If lab work was sent, you will see your test result via [employer]  Any prescriptions will be sent electronically to your pharmacy listed    I look forward to seeing you next year for your health care needs.  Please remember:     Exercise such as walking for 20 minutes per day is beneficial to your physical and mental healt   Healthy diets can be expensive -- if you every feel like you are struggling to afford healthy food, please let me know as we have a very good program called [employer] that is available to you    Be well!  Lulu

## 2025-07-10 ENCOUNTER — OFFICE VISIT (OUTPATIENT)
Facility: CLINIC | Age: 70
End: 2025-07-10
Payer: MEDICARE

## 2025-07-10 VITALS
BODY MASS INDEX: 29.34 KG/M2 | HEIGHT: 63 IN | WEIGHT: 165.6 LBS | DIASTOLIC BLOOD PRESSURE: 78 MMHG | SYSTOLIC BLOOD PRESSURE: 136 MMHG

## 2025-07-10 DIAGNOSIS — Z01.419 WELL WOMAN EXAM: Primary | ICD-10-CM

## 2025-07-10 DIAGNOSIS — Z12.31 ENCOUNTER FOR SCREENING MAMMOGRAM FOR MALIGNANT NEOPLASM OF BREAST: ICD-10-CM

## 2025-07-10 PROCEDURE — 1159F MED LIST DOCD IN RCRD: CPT | Performed by: ADVANCED PRACTICE MIDWIFE

## 2025-07-10 PROCEDURE — 1036F TOBACCO NON-USER: CPT | Performed by: ADVANCED PRACTICE MIDWIFE

## 2025-07-10 PROCEDURE — 3008F BODY MASS INDEX DOCD: CPT | Performed by: ADVANCED PRACTICE MIDWIFE

## 2025-07-10 PROCEDURE — 99387 INIT PM E/M NEW PAT 65+ YRS: CPT | Performed by: ADVANCED PRACTICE MIDWIFE

## 2025-07-10 PROCEDURE — 1126F AMNT PAIN NOTED NONE PRSNT: CPT | Performed by: ADVANCED PRACTICE MIDWIFE

## 2025-07-10 ASSESSMENT — ENCOUNTER SYMPTOMS
OCCASIONAL FEELINGS OF UNSTEADINESS: 0
LOSS OF SENSATION IN FEET: 0
DEPRESSION: 0

## 2025-07-10 ASSESSMENT — PAIN SCALES - GENERAL: PAINLEVEL_OUTOF10: 0-NO PAIN

## 2025-07-24 ENCOUNTER — APPOINTMENT (OUTPATIENT)
Facility: CLINIC | Age: 70
End: 2025-07-24
Payer: MEDICARE